# Patient Record
Sex: FEMALE | Race: WHITE | NOT HISPANIC OR LATINO | Employment: FULL TIME | URBAN - METROPOLITAN AREA
[De-identification: names, ages, dates, MRNs, and addresses within clinical notes are randomized per-mention and may not be internally consistent; named-entity substitution may affect disease eponyms.]

---

## 2023-08-12 LAB
CHLAMYDIA TRACH., AMPLIFIED: NEGATIVE
N. GONORRHEA, AMPLIFIED: NEGATIVE

## 2023-08-13 LAB — URINE CULTURE: NORMAL

## 2023-08-14 LAB
ERYTHROCYTE DISTRIBUTION WIDTH (RATIO) BY AUTOMATED COUNT: 13.3 % (ref 11.5–14.5)
ERYTHROCYTE MEAN CORPUSCULAR HEMOGLOBIN CONCENTRATION (G/DL) BY AUTOMATED: 32.7 G/DL (ref 32–36)
ERYTHROCYTE MEAN CORPUSCULAR VOLUME (FL) BY AUTOMATED COUNT: 90 FL (ref 80–100)
ERYTHROCYTES (10*6/UL) IN BLOOD BY AUTOMATED COUNT: 4.38 X10E12/L (ref 4–5.2)
HEMATOCRIT (%) IN BLOOD BY AUTOMATED COUNT: 39.4 % (ref 36–46)
HEMOGLOBIN (G/DL) IN BLOOD: 12.9 G/DL (ref 12–16)
HEPATITIS B VIRUS SURFACE AG PRESENCE IN SERUM: NONREACTIVE
HEPATITIS C VIRUS AB PRESENCE IN SERUM: NONREACTIVE
HIV 1/ 2 AG/AB SCREEN: NONREACTIVE
LEUKOCYTES (10*3/UL) IN BLOOD BY AUTOMATED COUNT: 6 X10E9/L (ref 4.4–11.3)
PLATELETS (10*3/UL) IN BLOOD AUTOMATED COUNT: 221 X10E9/L (ref 150–450)
REFLEX ADDED, ANEMIA PANEL: NORMAL
SYPHILIS TOTAL AB: NONREACTIVE

## 2023-08-15 LAB
ABO GROUP (TYPE) IN BLOOD: NORMAL
ANTIBODY SCREEN: NORMAL
ESTIMATED AVERAGE GLUCOSE FOR HBA1C: 103 MG/DL
HEMOGLOBIN A1C/HEMOGLOBIN TOTAL IN BLOOD: 5.2 %
RH FACTOR: NORMAL
RUBELLA VIRUS IGG AB: POSITIVE

## 2023-09-15 LAB
ALANINE AMINOTRANSFERASE (SGPT) (U/L) IN SER/PLAS: 14 U/L (ref 7–45)
ALBUMIN (G/DL) IN SER/PLAS: 4.1 G/DL (ref 3.4–5)
ALKALINE PHOSPHATASE (U/L) IN SER/PLAS: 61 U/L (ref 33–110)
ANION GAP IN SER/PLAS: 13 MMOL/L (ref 10–20)
ASPARTATE AMINOTRANSFERASE (SGOT) (U/L) IN SER/PLAS: 12 U/L (ref 9–39)
BILIRUBIN TOTAL (MG/DL) IN SER/PLAS: 0.4 MG/DL (ref 0–1.2)
CALCIUM (MG/DL) IN SER/PLAS: 9.8 MG/DL (ref 8.6–10.3)
CARBON DIOXIDE, TOTAL (MMOL/L) IN SER/PLAS: 25 MMOL/L (ref 21–32)
CHLORIDE (MMOL/L) IN SER/PLAS: 104 MMOL/L (ref 98–107)
CREATININE (MG/DL) IN SER/PLAS: 0.53 MG/DL (ref 0.5–1.05)
CREATININE (MG/DL) IN URINE: 62.7 MG/DL (ref 20–320)
ERYTHROCYTE DISTRIBUTION WIDTH (RATIO) BY AUTOMATED COUNT: 14.7 % (ref 11.5–14.5)
ERYTHROCYTE MEAN CORPUSCULAR HEMOGLOBIN CONCENTRATION (G/DL) BY AUTOMATED: 31.5 G/DL (ref 32–36)
ERYTHROCYTE MEAN CORPUSCULAR VOLUME (FL) BY AUTOMATED COUNT: 94 FL (ref 80–100)
ERYTHROCYTES (10*6/UL) IN BLOOD BY AUTOMATED COUNT: 4.03 X10E12/L (ref 4–5.2)
GFR FEMALE: >90 ML/MIN/1.73M2
GLUCOSE (MG/DL) IN SER/PLAS: 87 MG/DL (ref 74–99)
HEMATOCRIT (%) IN BLOOD BY AUTOMATED COUNT: 37.8 % (ref 36–46)
HEMOGLOBIN (G/DL) IN BLOOD: 11.9 G/DL (ref 12–16)
LACTATE DEHYDROGENASE (U/L) IN SER/PLAS BY LAC->PYR RXN: 121 U/L (ref 84–246)
LEUKOCYTES (10*3/UL) IN BLOOD BY AUTOMATED COUNT: 6.3 X10E9/L (ref 4.4–11.3)
PLATELETS (10*3/UL) IN BLOOD AUTOMATED COUNT: 218 X10E9/L (ref 150–450)
POTASSIUM (MMOL/L) IN SER/PLAS: 3.5 MMOL/L (ref 3.5–5.3)
PROTEIN (MG/DL) IN URINE: 6 MG/DL (ref 5–24)
PROTEIN TOTAL: 6.7 G/DL (ref 6.4–8.2)
PROTEIN/CREATININE (MG/MG) IN URINE: 0.1 MG/MG CREAT (ref 0–0.17)
SODIUM (MMOL/L) IN SER/PLAS: 138 MMOL/L (ref 136–145)
URATE (MG/DL) IN SER/PLAS: 3.8 MG/DL (ref 2.3–6.7)
UREA NITROGEN (MG/DL) IN SER/PLAS: 8 MG/DL (ref 6–23)

## 2023-10-09 ENCOUNTER — APPOINTMENT (OUTPATIENT)
Dept: RADIOLOGY | Facility: CLINIC | Age: 48
End: 2023-10-09
Payer: COMMERCIAL

## 2023-10-09 ENCOUNTER — ANCILLARY PROCEDURE (OUTPATIENT)
Dept: RADIOLOGY | Facility: CLINIC | Age: 48
End: 2023-10-09
Payer: COMMERCIAL

## 2023-10-09 DIAGNOSIS — Z36.89 SCREENING, ANTENATAL, FOR FETAL ANATOMIC SURVEY (HHS-HCC): ICD-10-CM

## 2023-10-09 PROBLEM — Z64.1 GRAND MULTIPARA: Status: ACTIVE | Noted: 2023-10-09

## 2023-10-09 PROBLEM — O10.919 CHRONIC HYPERTENSION AFFECTING PREGNANCY (HHS-HCC): Status: ACTIVE | Noted: 2023-10-09

## 2023-10-09 PROBLEM — O09.522: Status: ACTIVE | Noted: 2023-10-09

## 2023-10-09 PROBLEM — Z87.59 HISTORY OF SHOULDER DYSTOCIA IN PRIOR PREGNANCY: Status: ACTIVE | Noted: 2023-10-09

## 2023-10-09 PROCEDURE — 76811 OB US DETAILED SNGL FETUS: CPT

## 2023-10-09 PROCEDURE — 76811 OB US DETAILED SNGL FETUS: CPT | Performed by: OBSTETRICS & GYNECOLOGY

## 2023-10-10 ENCOUNTER — ROUTINE PRENATAL (OUTPATIENT)
Dept: OBSTETRICS AND GYNECOLOGY | Facility: CLINIC | Age: 48
End: 2023-10-10
Payer: COMMERCIAL

## 2023-10-10 VITALS
BODY MASS INDEX: 30.54 KG/M2 | SYSTOLIC BLOOD PRESSURE: 112 MMHG | DIASTOLIC BLOOD PRESSURE: 86 MMHG | WEIGHT: 201.5 LBS | HEIGHT: 68 IN

## 2023-10-10 DIAGNOSIS — O09.522 HIGH RISK PREGNANCY, MULTIGRAVIDA OF ADVANCED MATERNAL AGE IN SECOND TRIMESTER (HHS-HCC): Primary | ICD-10-CM

## 2023-10-10 DIAGNOSIS — O10.919 CHRONIC HYPERTENSION AFFECTING PREGNANCY (HHS-HCC): ICD-10-CM

## 2023-10-10 DIAGNOSIS — Z3A.19 19 WEEKS GESTATION OF PREGNANCY (HHS-HCC): ICD-10-CM

## 2023-10-10 PROCEDURE — 0501F PRENATAL FLOW SHEET: CPT

## 2023-10-10 RX ORDER — ASPIRIN 81 MG/1
162 TABLET ORAL DAILY
Status: ON HOLD | COMMUNITY
End: 2024-02-17 | Stop reason: WASHOUT

## 2023-10-10 NOTE — PROGRESS NOTES
Subjective     Jackelin Umana is a 48 y.o.  at 19w2d with a working estimated date of delivery of 3/3/2024, by Ultrasound who presents for a routine prenatal visit. She denies vaginal bleeding, leakage of fluid, or contractions. Patient reports feeling fetal movement and compliance with PNV and ASA    Dx of cHTN with last visit, though data is not available. Patient reports diastolic BP in the 90's. Reviewed baseline pre-e labs with patient- all wnl. Discussed  screening and recommendations for IOL at 38 weeks. Patient agreeable.       Current Outpatient Medications on File Prior to Visit   Medication Sig Dispense Refill    aspirin 81 mg EC tablet Take 2 tablets (162 mg) by mouth once daily.      prenatal no122/iron/folic acid (PRENATAL MULTI ORAL) Take by mouth once every day.       No current facility-administered medications on file prior to visit.       Her pregnancy is complicated by:  Pregnancy Problems (from 24 to present)       Problem Noted Resolved    High risk pregnancy, multigravida of advanced maternal age in second trimester 10/9/2023 by LIZZIE Marin No    Priority:  Medium      Overview Addendum 10/9/2023 10:21 PM by LIZZIE Marin     AMA >40 at [facility]  S/p rr NIPS  <>Growth US at 30 and 36 weeks  <> BPP or NST weekly  beginning at 36 weeks           Chronic hypertension affecting pregnancy 10/9/2023 by LIZZIE Marin No    Priority:  Medium      Overview Addendum 10/9/2023 10:08 PM by LIZZIE Marin     Chronic HTN not on meds  Growth US at 30 and 36 weeks  Delivery between 38.0-39.6                  Objective   Weight: 91.4 kg (201 lb 8 oz)  Expected Total Weight Gain: 5 kg (11 lb)-9 kg (19 lb)   Pregravid BMI: 30.11  BP: 112/86    Prenatal Labs  Lab Results   Component Value Date    HGB 11.9 (L) 09/15/2023    HCT 37.8 09/15/2023    HEPBSAG NONREACTIVE 2023         Assessment/Plan       Labs reviewed.  Reviewed s/sx of  PTL, warning signs, and when to call provider  Follow up in 4 weeks for a routine prenatal visit.    LIZZIE Marin

## 2023-11-08 ENCOUNTER — ROUTINE PRENATAL (OUTPATIENT)
Dept: OBSTETRICS AND GYNECOLOGY | Facility: CLINIC | Age: 48
End: 2023-11-08
Payer: COMMERCIAL

## 2023-11-08 VITALS — WEIGHT: 203.25 LBS | DIASTOLIC BLOOD PRESSURE: 86 MMHG | BODY MASS INDEX: 30.9 KG/M2 | SYSTOLIC BLOOD PRESSURE: 112 MMHG

## 2023-11-08 DIAGNOSIS — O09.522 HIGH RISK PREGNANCY, MULTIGRAVIDA OF ADVANCED MATERNAL AGE IN SECOND TRIMESTER (HHS-HCC): Primary | ICD-10-CM

## 2023-11-08 PROCEDURE — 0501F PRENATAL FLOW SHEET: CPT

## 2023-11-08 NOTE — PROGRESS NOTES
Subjective     Jackelin Umana is a 48 y.o.  at 23w3d with a working estimated date of delivery of 3/3/2024, by Ultrasound who presents for a routine prenatal visit. She denies vaginal bleeding, leakage of fluid, or contractions. Patient reports fetal movement and compliance with meds. Patient reports overall feeling well.     Patient aware of need to complete CBC and one hour prior to next visit. BLOOD TYPE O+.  Patient educated that she does not have to fast for the one hour glucose, however she should avoid a carb heavy meal prior to the test.    Patient counseled on flu vaccine and declines    Current Outpatient Medications on File Prior to Visit   Medication Sig Dispense Refill    aspirin 81 mg EC tablet Take 2 tablets (162 mg) by mouth once daily.      prenatal no122/iron/folic acid (PRENATAL MULTI ORAL) Take by mouth once every day.       No current facility-administered medications on file prior to visit.        Her pregnancy is complicated by:  Medical Problems       Problem List       High risk pregnancy, multigravida of advanced maternal age in second trimester    Overview Addendum 10/9/2023 10:21 PM by LIZZIE Marin     AMA >40 at [facility]  S/p rr NIPS  <>Growth US at 30 and 36 weeks  <> BPP or NST weekly  beginning at 36 weeks           Chronic hypertension affecting pregnancy    Overview Addendum 10/9/2023 10:08 PM by LIZZIE Marin     Chronic HTN not on meds  Growth US at 30 and 36 weeks  Delivery between 38.0-39.6         History of shoulder dystocia in prior pregnancy    Overview Addendum 10/9/2023 10:20 PM by LIZZIE Marin     Physician counseling in third trimester for hx of shoulder dystocia         Grand multipara    Overview Addendum 10/9/2023 10:20 PM by LIZZIE Marin     HX of: GDM;Pre-e, gHTN- on ASA; PPH in first pregnancy  Shoulder dystocia in last pregnancy @ 9# 0oz, proven to 9# 4 oz. Physician counseling in third trimester for hx  of shoulder dystocia  Declines flu and COVID                  Objective   Weight: 92.2 kg (203 lb 4 oz)  Expected Total Weight Gain: 5 kg (11 lb)-9 kg (19 lb)   TW.381 kg (5 lb 4 oz)  Pregravid BMI: 30.11      BP: 112/86      Assessment/Plan   One hour and CBC with next visit.   Offer tdap at next visit   Follow up in 2 weeks for a routine prenatal visit.    JOSE MIGUEL Marin-TORITO

## 2023-12-08 ENCOUNTER — ROUTINE PRENATAL (OUTPATIENT)
Dept: OBSTETRICS AND GYNECOLOGY | Facility: CLINIC | Age: 48
End: 2023-12-08
Payer: COMMERCIAL

## 2023-12-08 ENCOUNTER — HOSPITAL ENCOUNTER (OUTPATIENT)
Facility: HOSPITAL | Age: 48
Discharge: HOME | End: 2023-12-08
Attending: OBSTETRICS & GYNECOLOGY | Admitting: OBSTETRICS & GYNECOLOGY
Payer: COMMERCIAL

## 2023-12-08 VITALS
TEMPERATURE: 98.2 F | RESPIRATION RATE: 19 BRPM | DIASTOLIC BLOOD PRESSURE: 68 MMHG | SYSTOLIC BLOOD PRESSURE: 150 MMHG | HEART RATE: 76 BPM | OXYGEN SATURATION: 97 % | HEIGHT: 68 IN | BODY MASS INDEX: 30.82 KG/M2 | WEIGHT: 203.37 LBS

## 2023-12-08 VITALS — DIASTOLIC BLOOD PRESSURE: 84 MMHG | BODY MASS INDEX: 30.87 KG/M2 | WEIGHT: 203 LBS | SYSTOLIC BLOOD PRESSURE: 152 MMHG

## 2023-12-08 DIAGNOSIS — Z3A.27 27 WEEKS GESTATION OF PREGNANCY (HHS-HCC): ICD-10-CM

## 2023-12-08 DIAGNOSIS — Z13.1 ENCOUNTER FOR SCREENING FOR DIABETES MELLITUS: ICD-10-CM

## 2023-12-08 LAB
ALBUMIN SERPL BCP-MCNC: 3.4 G/DL (ref 3.4–5)
ALP SERPL-CCNC: 71 U/L (ref 33–110)
ALT SERPL W P-5'-P-CCNC: 9 U/L (ref 7–45)
ANION GAP SERPL CALC-SCNC: 9 MMOL/L (ref 10–20)
AST SERPL W P-5'-P-CCNC: 12 U/L (ref 9–39)
BILIRUB SERPL-MCNC: 0.4 MG/DL (ref 0–1.2)
BUN SERPL-MCNC: 5 MG/DL (ref 6–23)
CALCIUM SERPL-MCNC: 8.8 MG/DL (ref 8.6–10.3)
CHLORIDE SERPL-SCNC: 105 MMOL/L (ref 98–107)
CO2 SERPL-SCNC: 27 MMOL/L (ref 21–32)
CREAT SERPL-MCNC: 0.43 MG/DL (ref 0.5–1.05)
CREAT UR-MCNC: 4.6 MG/DL (ref 20–320)
ERYTHROCYTE [DISTWIDTH] IN BLOOD BY AUTOMATED COUNT: 15.7 % (ref 11.5–14.5)
ERYTHROCYTE [DISTWIDTH] IN BLOOD BY AUTOMATED COUNT: 15.9 % (ref 11.5–14.5)
GFR SERPL CREATININE-BSD FRML MDRD: >90 ML/MIN/1.73M*2
GLUCOSE 1H P 50 G GLC PO SERPL-MCNC: 150 MG/DL
GLUCOSE SERPL-MCNC: 62 MG/DL (ref 74–99)
HCT VFR BLD AUTO: 29 % (ref 36–46)
HCT VFR BLD AUTO: 30.1 % (ref 36–46)
HGB BLD-MCNC: 9.4 G/DL (ref 12–16)
HGB BLD-MCNC: 9.5 G/DL (ref 12–16)
LDH SERPL L TO P-CCNC: 130 U/L (ref 84–246)
MCH RBC QN AUTO: 29 PG (ref 26–34)
MCH RBC QN AUTO: 29.8 PG (ref 26–34)
MCHC RBC AUTO-ENTMCNC: 31.6 G/DL (ref 32–36)
MCHC RBC AUTO-ENTMCNC: 32.4 G/DL (ref 32–36)
MCV RBC AUTO: 90 FL (ref 80–100)
MCV RBC AUTO: 94 FL (ref 80–100)
NRBC BLD-RTO: 0 /100 WBCS (ref 0–0)
NRBC BLD-RTO: 0 /100 WBCS (ref 0–0)
PLATELET # BLD AUTO: 162 X10*3/UL (ref 150–450)
PLATELET # BLD AUTO: 176 X10*3/UL (ref 150–450)
POTASSIUM SERPL-SCNC: 3.3 MMOL/L (ref 3.5–5.3)
PROT SERPL-MCNC: 6.2 G/DL (ref 6.4–8.2)
PROT UR-ACNC: <4 MG/DL (ref 5–24)
PROT/CREAT UR: ABNORMAL MG/G{CREAT}
RBC # BLD AUTO: 3.19 X10*6/UL (ref 4–5.2)
RBC # BLD AUTO: 3.24 X10*6/UL (ref 4–5.2)
SODIUM SERPL-SCNC: 138 MMOL/L (ref 136–145)
WBC # BLD AUTO: 5.6 X10*3/UL (ref 4.4–11.3)
WBC # BLD AUTO: 6 X10*3/UL (ref 4.4–11.3)

## 2023-12-08 PROCEDURE — 82728 ASSAY OF FERRITIN: CPT

## 2023-12-08 PROCEDURE — 99214 OFFICE O/P EST MOD 30 MIN: CPT | Performed by: OBSTETRICS & GYNECOLOGY

## 2023-12-08 PROCEDURE — 83550 IRON BINDING TEST: CPT

## 2023-12-08 PROCEDURE — 85027 COMPLETE CBC AUTOMATED: CPT | Performed by: OBSTETRICS & GYNECOLOGY

## 2023-12-08 PROCEDURE — 82947 ASSAY GLUCOSE BLOOD QUANT: CPT

## 2023-12-08 PROCEDURE — 82607 VITAMIN B-12: CPT

## 2023-12-08 PROCEDURE — 80053 COMPREHEN METABOLIC PANEL: CPT | Performed by: OBSTETRICS & GYNECOLOGY

## 2023-12-08 PROCEDURE — 82746 ASSAY OF FOLIC ACID SERUM: CPT

## 2023-12-08 PROCEDURE — 85027 COMPLETE CBC AUTOMATED: CPT

## 2023-12-08 PROCEDURE — 82570 ASSAY OF URINE CREATININE: CPT | Performed by: OBSTETRICS & GYNECOLOGY

## 2023-12-08 PROCEDURE — 36415 COLL VENOUS BLD VENIPUNCTURE: CPT | Performed by: OBSTETRICS & GYNECOLOGY

## 2023-12-08 PROCEDURE — 83615 LACTATE (LD) (LDH) ENZYME: CPT | Performed by: OBSTETRICS & GYNECOLOGY

## 2023-12-08 PROCEDURE — 0501F PRENATAL FLOW SHEET: CPT | Performed by: ADVANCED PRACTICE MIDWIFE

## 2023-12-08 PROCEDURE — 99213 OFFICE O/P EST LOW 20 MIN: CPT | Mod: 25

## 2023-12-08 PROCEDURE — 36415 COLL VENOUS BLD VENIPUNCTURE: CPT

## 2023-12-08 RX ORDER — LABETALOL HYDROCHLORIDE 5 MG/ML
20 INJECTION, SOLUTION INTRAVENOUS ONCE AS NEEDED
Status: DISCONTINUED | OUTPATIENT
Start: 2023-12-08 | End: 2023-12-08 | Stop reason: HOSPADM

## 2023-12-08 RX ORDER — ONDANSETRON 4 MG/1
4 TABLET, FILM COATED ORAL EVERY 6 HOURS PRN
Status: DISCONTINUED | OUTPATIENT
Start: 2023-12-08 | End: 2023-12-08 | Stop reason: HOSPADM

## 2023-12-08 RX ORDER — NIFEDIPINE 10 MG/1
10 CAPSULE ORAL ONCE AS NEEDED
Status: DISCONTINUED | OUTPATIENT
Start: 2023-12-08 | End: 2023-12-08 | Stop reason: HOSPADM

## 2023-12-08 RX ORDER — LIDOCAINE HYDROCHLORIDE 10 MG/ML
0.5 INJECTION, SOLUTION EPIDURAL; INFILTRATION; INTRACAUDAL; PERINEURAL ONCE AS NEEDED
Status: DISCONTINUED | OUTPATIENT
Start: 2023-12-08 | End: 2023-12-08 | Stop reason: HOSPADM

## 2023-12-08 RX ORDER — FERROUS SULFATE 325(65) MG
65 TABLET ORAL
Qty: 30 TABLET | Refills: 3 | Status: SHIPPED | OUTPATIENT
Start: 2023-12-08 | End: 2024-01-07

## 2023-12-08 RX ORDER — ONDANSETRON HYDROCHLORIDE 2 MG/ML
4 INJECTION, SOLUTION INTRAVENOUS EVERY 6 HOURS PRN
Status: DISCONTINUED | OUTPATIENT
Start: 2023-12-08 | End: 2023-12-08 | Stop reason: HOSPADM

## 2023-12-08 RX ORDER — HYDRALAZINE HYDROCHLORIDE 20 MG/ML
5 INJECTION INTRAMUSCULAR; INTRAVENOUS ONCE AS NEEDED
Status: DISCONTINUED | OUTPATIENT
Start: 2023-12-08 | End: 2023-12-08 | Stop reason: HOSPADM

## 2023-12-08 SDOH — SOCIAL STABILITY: SOCIAL INSECURITY: HAVE YOU HAD THOUGHTS OF HARMING ANYONE ELSE?: NO

## 2023-12-08 SDOH — HEALTH STABILITY: MENTAL HEALTH: HAVE YOU USED ANY PRESCRIPTION DRUGS OTHER THAN PRESCRIBED IN THE PAST 12 MONTHS?: NO

## 2023-12-08 SDOH — SOCIAL STABILITY: SOCIAL INSECURITY: DO YOU FEEL ANYONE HAS EXPLOITED OR TAKEN ADVANTAGE OF YOU FINANCIALLY OR OF YOUR PERSONAL PROPERTY?: NO

## 2023-12-08 SDOH — HEALTH STABILITY: MENTAL HEALTH: HAVE YOU USED ANY SUBSTANCES (CANABIS, COCAINE, HEROIN, HALLUCINOGENS, INHALANTS, ETC.) IN THE PAST 12 MONTHS?: NO

## 2023-12-08 SDOH — HEALTH STABILITY: MENTAL HEALTH: NON-SPECIFIC ACTIVE SUICIDAL THOUGHTS (PAST 1 MONTH): NO

## 2023-12-08 SDOH — SOCIAL STABILITY: SOCIAL INSECURITY: DOES ANYONE TRY TO KEEP YOU FROM HAVING/CONTACTING OTHER FRIENDS OR DOING THINGS OUTSIDE YOUR HOME?: NO

## 2023-12-08 SDOH — SOCIAL STABILITY: SOCIAL INSECURITY: PHYSICAL ABUSE: DENIES

## 2023-12-08 SDOH — SOCIAL STABILITY: SOCIAL INSECURITY: VERBAL ABUSE: DENIES

## 2023-12-08 SDOH — SOCIAL STABILITY: SOCIAL INSECURITY: ARE YOU OR HAVE YOU BEEN THREATENED OR ABUSED PHYSICALLY, EMOTIONALLY, OR SEXUALLY BY ANYONE?: NO

## 2023-12-08 SDOH — SOCIAL STABILITY: SOCIAL INSECURITY: ARE THERE ANY APPARENT SIGNS OF INJURIES/BEHAVIORS THAT COULD BE RELATED TO ABUSE/NEGLECT?: NO

## 2023-12-08 SDOH — SOCIAL STABILITY: SOCIAL INSECURITY: ABUSE SCREEN: ADULT

## 2023-12-08 SDOH — HEALTH STABILITY: MENTAL HEALTH: WERE YOU ABLE TO COMPLETE ALL THE BEHAVIORAL HEALTH SCREENINGS?: YES

## 2023-12-08 SDOH — SOCIAL STABILITY: SOCIAL INSECURITY: HAS ANYONE EVER THREATENED TO HURT YOUR FAMILY OR YOUR PETS?: NO

## 2023-12-08 SDOH — HEALTH STABILITY: MENTAL HEALTH: WISH TO BE DEAD (PAST 1 MONTH): NO

## 2023-12-08 SDOH — HEALTH STABILITY: MENTAL HEALTH: SUICIDAL BEHAVIOR (LIFETIME): NO

## 2023-12-08 SDOH — ECONOMIC STABILITY: HOUSING INSECURITY: DO YOU FEEL UNSAFE GOING BACK TO THE PLACE WHERE YOU ARE LIVING?: NO

## 2023-12-08 ASSESSMENT — SOCIAL DETERMINANTS OF HEALTH (SDOH)
WITHIN THE LAST YEAR, HAVE YOU BEEN HUMILIATED OR EMOTIONALLY ABUSED IN OTHER WAYS BY YOUR PARTNER OR EX-PARTNER?: NO
HOW OFTEN DO YOU GET TOGETHER WITH FRIENDS OR RELATIVES?: MORE THAN THREE TIMES A WEEK
HOW OFTEN DO YOU ATTEND CHURCH OR RELIGIOUS SERVICES?: MORE THAN 4 TIMES PER YEAR
DO YOU BELONG TO ANY CLUBS OR ORGANIZATIONS SUCH AS CHURCH GROUPS UNIONS, FRATERNAL OR ATHLETIC GROUPS, OR SCHOOL GROUPS?: YES
WITHIN THE LAST YEAR, HAVE TO BEEN RAPED OR FORCED TO HAVE ANY KIND OF SEXUAL ACTIVITY BY YOUR PARTNER OR EX-PARTNER?: NO
IN A TYPICAL WEEK, HOW MANY TIMES DO YOU TALK ON THE PHONE WITH FAMILY, FRIENDS, OR NEIGHBORS?: MORE THAN THREE TIMES A WEEK
WITHIN THE LAST YEAR, HAVE YOU BEEN AFRAID OF YOUR PARTNER OR EX-PARTNER?: NO
WITHIN THE LAST YEAR, HAVE YOU BEEN KICKED, HIT, SLAPPED, OR OTHERWISE PHYSICALLY HURT BY YOUR PARTNER OR EX-PARTNER?: NO
HOW OFTEN DO YOU ATTENT MEETINGS OF THE CLUB OR ORGANIZATION YOU BELONG TO?: MORE THAN 4 TIMES PER YEAR

## 2023-12-08 ASSESSMENT — LIFESTYLE VARIABLES
AUDIT-C TOTAL SCORE: 0
AUDIT-C TOTAL SCORE: 0
HOW OFTEN DO YOU HAVE A DRINK CONTAINING ALCOHOL: NEVER
SKIP TO QUESTIONS 9-10: 1
HOW OFTEN DO YOU HAVE 6 OR MORE DRINKS ON ONE OCCASION: NEVER
HOW MANY STANDARD DRINKS CONTAINING ALCOHOL DO YOU HAVE ON A TYPICAL DAY: PATIENT DOES NOT DRINK

## 2023-12-08 ASSESSMENT — PATIENT HEALTH QUESTIONNAIRE - PHQ9
2. FEELING DOWN, DEPRESSED OR HOPELESS: NOT AT ALL
1. LITTLE INTEREST OR PLEASURE IN DOING THINGS: NOT AT ALL
SUM OF ALL RESPONSES TO PHQ9 QUESTIONS 1 & 2: 0

## 2023-12-08 ASSESSMENT — PAIN SCALES - GENERAL: PAINLEVEL_OUTOF10: 0 - NO PAIN

## 2023-12-08 NOTE — H&P
Obstetrical TRIAGE History and Physical    Assessment/Plan    Jackelin Umana is a 48 y.o.  at 27w5d admitted for mild range BP in office likely cHTN.    #cHTN  HELLP labs nl  No sx's superimposed preeclampsia with severe features  Plan MFM follow up next week and ultrasound  Will start home BP monitoring, warning signs given    #anemia  Reflex panel pending  Will start oral iron    Medical Problems       Problem List       High risk pregnancy, multigravida of advanced maternal age in second trimester    Overview Addendum 10/9/2023 10:21 PM by LIZZIE Marin     AMA >40 at [facility]  S/p rr NIPS  <>Growth US at 30 and 36 weeks  <> BPP or NST weekly  beginning at 36 weeks           Chronic hypertension affecting pregnancy    Overview Addendum 10/9/2023 10:08 PM by LIZZIE Marin     Chronic HTN not on meds  Growth US at 30 and 36 weeks  Delivery between 38.0-39.6         History of shoulder dystocia in prior pregnancy    Overview Addendum 10/9/2023 10:20 PM by LIZZIE Marin     Physician counseling in third trimester for hx of shoulder dystocia         Grand multipara    Overview Addendum 10/9/2023 10:20 PM by LIZZIE Marin     HX of: GDM;Pre-e, gHTN- on ASA; PPH in first pregnancy  Shoulder dystocia in last pregnancy @ 9# 0oz, proven to 9# 4 oz. Physician counseling in third trimester for hx of shoulder dystocia  Declines flu and COVID                 Subjective   48 yo  sent from office for mild range BP in office.  H/o PEC with prior pregnancies requiring Mag.  Has had recorded BP elevations outside of pregnancy and a borderline BP in early pregnancy.  Does not endorse headache, vision change, RUQ pain, dyspnea, or chest pain.      Obstetrical History   OB History    Para Term  AB Living   11 7 7   3 7   SAB IAB Ectopic Multiple Live Births   3       7      # Outcome Date GA Lbr Alexandro/2nd Weight Sex Delivery Anes PTL Lv   11 Current             10 SAB 2022 7w0d          9 Term 12/31/18 40w0d  4082 g F Vag-Spont None  DAJA      Complications: Shoulder Dystocia   8 Term 09/08/16 39w0d  4196 g F Vag-Spont None  DAJA   7 Term 03/05/14 38w5d  4139 g F Vag-Spont EPI  DAJA   6 Term 02/10/12 39w0d  4082 g M Vag-Spont EPI  DAJA   5 Term 11/03/08 39w0d  4167 g F Vag-Spont None  DAJA   4 Term 02/08/07 39w0d  4082 g M Vag-Spont EPI  DAJA   3 Term 01/18/05 38w0d  4111 g M Vag-Spont EPI  DAJA   2 SAB  6w0d          1 SAB  6w0d              Past Medical History  Past Medical History:   Diagnosis Date    Gestational diabetes     Postpartum hypertension         Past Surgical History   History reviewed. No pertinent surgical history.    Social History  Social History     Tobacco Use    Smoking status: Former     Types: Cigarettes    Smokeless tobacco: Not on file   Substance Use Topics    Alcohol use: Not Currently     Substance and Sexual Activity   Drug Use Never       Allergies  Patient has no known allergies.     Medications  Medications Prior to Admission   Medication Sig Dispense Refill Last Dose    aspirin 81 mg EC tablet Take 2 tablets (162 mg) by mouth once daily.   12/8/2023    prenatal no122/iron/folic acid (PRENATAL MULTI ORAL) Take by mouth once every day.   12/8/2023       Objective    Last Vitals  Temp Pulse Resp BP MAP O2 Sat   36.9 °C (98.4 °F) 87 20 (!) 140/70   97 %     Physical Examination  General: no acute distress  HEENT: normocephalic, atraumatic  Heart: RRR no murmur  Lungs: clear b/l  Abdomen: gravid  Extremities: moving all extremities, DTR +2  Neuro: awake and conversant  Psych: appropriate mood and affect    NST  Baseline FHR:140  Accelerations: +  Decelerations: -  AGA      Lab Review  Lab Results   Component Value Date    WBC 6.0 12/08/2023    HGB 9.4 (L) 12/08/2023    HCT 29.0 (L) 12/08/2023     12/08/2023     Lab Results   Component Value Date    GLUCOSE 62 (L) 12/08/2023     12/08/2023    K 3.3 (L) 12/08/2023     12/08/2023     CO2 27 12/08/2023    ANIONGAP 9 (L) 12/08/2023    BUN 5 (L) 12/08/2023    CREATININE 0.43 (L) 12/08/2023    EGFR >90 12/08/2023    CALCIUM 8.8 12/08/2023    ALBUMIN 3.4 12/08/2023    PROT 6.2 (L) 12/08/2023    ALKPHOS 71 12/08/2023    ALT 9 12/08/2023    AST 12 12/08/2023    BILITOT 0.4 12/08/2023     Lab Results   Component Value Date    [facility]  12/08/2023      Comment:      One or more analytes used in this calculation is outside of the analytical measurement range. Calculation cannot be performed.

## 2023-12-08 NOTE — PROGRESS NOTES
Subjective   Patient ID [id number]   Jackelin Umana is a 48 y.o.  at 27w5d with a working estimated date of delivery of 3/3/2024, by Ultrasound who presents for a routine prenatal visit. She denies vaginal bleeding, contractions or leaking of fluid.    No complaints today  Denies h/a visual changes or ruq pain  H/o preeclampsia necessitating magnesium sulfate  Currently on ASA    Objective   Physical Exam:     Constitutional: Alert and oriented, cooperative, no acute distress, well nourished, well hydrated     HEENT: normal     OB:  fundus at 29 cm    Neuropsych: normal affect, well groomed, good eye contact           , Pregravid BMI: 30.11  Expected Total Weight Gain: 5 kg (11 lb)-9 kg (19 lb)             Lab Results   Component Value Date    HGB 11.9 (L) 09/15/2023    ABO O 2023    LABRH POS 2023       Assessment/Plan   Continue prenatal vitamin.  CBC, GTT today  Sent to hospital for serial bp and labs explained to pt need to monitor  Discussed with Dr. Watters Pt to transfer to physician service, consult with MFM, ultrasounds scheduled

## 2023-12-08 NOTE — NURSING NOTE
Dr. Watters at bedside. Assessment and H&P performed. New orders received to draw Pre-E labs and obtain a 20 minute NST

## 2023-12-08 NOTE — NURSING NOTE
Dr. Watters at patient bedside. H&P performed with assessment. New orders received to obtain a 20 min NST and pre e labs

## 2023-12-08 NOTE — NURSING NOTE
Discuessed with patient BP monitoring at home. Patient assessed and meets criteria with diagnosis of Preeclampsia/maternal hypertension or any previous hypertension issues. Pt agreed to ordering home BP monitoring equipment. Demo sheet and order completed. Home monitoring log reviewed with patient prior to discharge. Pt verbalizes understanding of importance in home BP monitoring.

## 2023-12-09 LAB
FERRITIN SERPL-MCNC: 20 NG/ML
FOLATE SERPL-MCNC: >24 NG/ML
IRON SATN MFR SERPL: 17 %
IRON SERPL-MCNC: 83 UG/DL
REFLEX ADDED, ANEMIA PANEL: NORMAL
TIBC SERPL-MCNC: 492 UG/DL
UIBC SERPL-MCNC: 409 UG/DL
VIT B12 SERPL-MCNC: 280 PG/ML

## 2023-12-10 PROBLEM — O99.013 ANEMIA AFFECTING PREGNANCY IN THIRD TRIMESTER (HHS-HCC): Status: ACTIVE | Noted: 2023-12-10

## 2023-12-10 PROBLEM — R73.09 ELEVATED GLUCOSE TOLERANCE TEST: Status: ACTIVE | Noted: 2023-12-10

## 2023-12-11 DIAGNOSIS — O10.919 CHRONIC HYPERTENSION AFFECTING PREGNANCY (HHS-HCC): ICD-10-CM

## 2023-12-11 DIAGNOSIS — Z13.1 SCREENING FOR DIABETES MELLITUS: ICD-10-CM

## 2023-12-13 LAB
HOLD SPECIMEN: NORMAL

## 2023-12-18 ENCOUNTER — LAB (OUTPATIENT)
Dept: LAB | Facility: LAB | Age: 48
End: 2023-12-18
Payer: COMMERCIAL

## 2023-12-18 ENCOUNTER — DOCUMENTATION (OUTPATIENT)
Dept: OBSTETRICS AND GYNECOLOGY | Facility: HOSPITAL | Age: 48
End: 2023-12-18

## 2023-12-18 DIAGNOSIS — Z13.1 SCREENING FOR DIABETES MELLITUS: ICD-10-CM

## 2023-12-18 LAB
GLUCOSE 1H P 100 G GLC PO SERPL-MCNC: 200 MG/DL
GLUCOSE 2H P 100 G GLC PO SERPL-MCNC: 134 MG/DL
GLUCOSE 3H P 100 G GLC PO SERPL-MCNC: 38 MG/DL
GLUCOSE P FAST SERPL-MCNC: 109 MG/DL

## 2023-12-18 PROCEDURE — 36415 COLL VENOUS BLD VENIPUNCTURE: CPT

## 2023-12-18 PROCEDURE — 82951 GLUCOSE TOLERANCE TEST (GTT): CPT

## 2023-12-18 PROCEDURE — 82950 GLUCOSE TEST: CPT

## 2023-12-18 PROCEDURE — 82947 ASSAY GLUCOSE BLOOD QUANT: CPT

## 2023-12-18 PROCEDURE — 82952 GTT-ADDED SAMPLES: CPT

## 2023-12-18 NOTE — PROGRESS NOTES
Received critical lab value call from the lab.  Patient's 3 hour glucose was 38.  Called to see how patient was feeling x 2.  No answer received.  Left message to call back.

## 2023-12-20 ENCOUNTER — ROUTINE PRENATAL (OUTPATIENT)
Dept: OBSTETRICS AND GYNECOLOGY | Facility: CLINIC | Age: 48
End: 2023-12-20
Payer: COMMERCIAL

## 2023-12-20 VITALS
WEIGHT: 204.25 LBS | BODY MASS INDEX: 31.06 KG/M2 | DIASTOLIC BLOOD PRESSURE: 80 MMHG | SYSTOLIC BLOOD PRESSURE: 148 MMHG

## 2023-12-20 DIAGNOSIS — Z64.1 GRAND MULTIPARA: ICD-10-CM

## 2023-12-20 DIAGNOSIS — O99.013 ANEMIA AFFECTING PREGNANCY IN THIRD TRIMESTER (HHS-HCC): ICD-10-CM

## 2023-12-20 DIAGNOSIS — O24.410 DIET CONTROLLED GESTATIONAL DIABETES MELLITUS (GDM) IN SECOND TRIMESTER (HHS-HCC): ICD-10-CM

## 2023-12-20 DIAGNOSIS — Z87.59 HISTORY OF SHOULDER DYSTOCIA IN PRIOR PREGNANCY: Primary | ICD-10-CM

## 2023-12-20 DIAGNOSIS — O10.919 CHRONIC HYPERTENSION AFFECTING PREGNANCY (HHS-HCC): ICD-10-CM

## 2023-12-20 DIAGNOSIS — O24.419 GESTATIONAL DIABETES MELLITUS (GDM) IN THIRD TRIMESTER, GESTATIONAL DIABETES METHOD OF CONTROL UNSPECIFIED (HHS-HCC): ICD-10-CM

## 2023-12-20 PROBLEM — R73.09 ELEVATED GLUCOSE TOLERANCE TEST: Status: RESOLVED | Noted: 2023-12-10 | Resolved: 2023-12-20

## 2023-12-20 PROCEDURE — 0501F PRENATAL FLOW SHEET: CPT | Performed by: OBSTETRICS & GYNECOLOGY

## 2023-12-20 RX ORDER — ISOPROPYL ALCOHOL 70 ML/100ML
SWAB TOPICAL
Qty: 100 EACH | Refills: 2 | Status: ON HOLD | OUTPATIENT
Start: 2023-12-20 | End: 2024-02-17 | Stop reason: WASHOUT

## 2023-12-20 RX ORDER — LANCETS
EACH MISCELLANEOUS
Qty: 100 EACH | Refills: 2 | Status: ON HOLD | OUTPATIENT
Start: 2023-12-20 | End: 2024-02-17 | Stop reason: WASHOUT

## 2023-12-20 RX ORDER — INSULIN PUMP SYRINGE, 3 ML
1 EACH MISCELLANEOUS 4 TIMES DAILY
Qty: 1 EACH | Refills: 0 | Status: ON HOLD | OUTPATIENT
Start: 2023-12-20 | End: 2024-02-17 | Stop reason: WASHOUT

## 2023-12-20 NOTE — PROGRESS NOTES
Medical Problems       Problem List    History of Postpartum hemorrhage    High risk pregnancy, multigravida of advanced maternal age in second trimester    Overview Addendum 10/9/2023 10:21 PM by LIZZIE Marin     AMA >40 at [facility]  S/p rr NIPS  <>Growth US at 30 and 36 weeks  <> BPP or NST weekly  beginning at 36 weeks           Chronic hypertension affecting pregnancy    Overview Addendum 10/9/2023 10:08 PM by LIZZIE Marin     Chronic HTN not on meds Home Bps wnl.   HELLP labs wnl last week   Cont monitoring, severe sx discussed.   Growth US at 30 and 36 weeks  Delivery between 38.0-39.6          History of shoulder dystocia in prior pregnancy    Overview Addendum 10/9/2023 10:20 PM by LIZZIE Marin     Physician counseling in third trimester for hx of shoulder dystocia  Option of csection discussed briefly today/ mb.         Grand multipara    Overview Addendum 10/9/2023 10:20 PM by LIZZIE Marin     HX of: GDM;Pre-e, gHTN- on ASA; PPH in first pregnancy  Shoulder dystocia in last pregnancy @ 9# 0oz, proven to 9# 4 oz. Physician counseling in third trimester for hx of shoulder dystocia  Declines flu and COVID           Anemia affecting pregnancy in third trimester    Overview Signed 12/10/2023 11:27 AM by LIZZIE Mitchell     Iron bid  Recheck labs 2 weeks         Gestational diabetes mellitus (GDM) in second trimester       To start Glucometer, MFM consult /US     Discussed switching to physician care     Eri Lewis MD

## 2023-12-26 ENCOUNTER — ANCILLARY PROCEDURE (OUTPATIENT)
Dept: RADIOLOGY | Facility: CLINIC | Age: 48
End: 2023-12-26
Payer: COMMERCIAL

## 2023-12-26 DIAGNOSIS — Z34.90 ENCOUNTER FOR SUPERVISION OF NORMAL PREGNANCY, UNSPECIFIED, UNSPECIFIED TRIMESTER (HHS-HCC): ICD-10-CM

## 2023-12-26 DIAGNOSIS — O09.529 AMA (ADVANCED MATERNAL AGE) MULTIGRAVIDA 35+ (HHS-HCC): ICD-10-CM

## 2023-12-26 PROCEDURE — 76819 FETAL BIOPHYS PROFIL W/O NST: CPT

## 2023-12-26 PROCEDURE — 76816 OB US FOLLOW-UP PER FETUS: CPT

## 2023-12-26 PROCEDURE — 76819 FETAL BIOPHYS PROFIL W/O NST: CPT | Performed by: OBSTETRICS & GYNECOLOGY

## 2023-12-26 PROCEDURE — 76816 OB US FOLLOW-UP PER FETUS: CPT | Performed by: OBSTETRICS & GYNECOLOGY

## 2023-12-27 ENCOUNTER — HOSPITAL ENCOUNTER (OUTPATIENT)
Facility: HOSPITAL | Age: 48
Discharge: HOME | End: 2023-12-27
Attending: OBSTETRICS & GYNECOLOGY | Admitting: OBSTETRICS & GYNECOLOGY
Payer: COMMERCIAL

## 2023-12-27 ENCOUNTER — HOSPITAL ENCOUNTER (OUTPATIENT)
Facility: HOSPITAL | Age: 48
End: 2023-12-27
Attending: OBSTETRICS & GYNECOLOGY | Admitting: OBSTETRICS & GYNECOLOGY
Payer: COMMERCIAL

## 2023-12-27 ENCOUNTER — INITIAL PRENATAL (OUTPATIENT)
Dept: MATERNAL FETAL MEDICINE | Facility: CLINIC | Age: 48
End: 2023-12-27
Payer: COMMERCIAL

## 2023-12-27 ENCOUNTER — EDUCATION (OUTPATIENT)
Dept: MATERNAL FETAL MEDICINE | Facility: HOSPITAL | Age: 48
End: 2023-12-27
Payer: COMMERCIAL

## 2023-12-27 VITALS
BODY MASS INDEX: 31.07 KG/M2 | OXYGEN SATURATION: 94 % | SYSTOLIC BLOOD PRESSURE: 129 MMHG | TEMPERATURE: 97.9 F | RESPIRATION RATE: 18 BRPM | WEIGHT: 205 LBS | DIASTOLIC BLOOD PRESSURE: 59 MMHG | HEART RATE: 90 BPM | HEIGHT: 68 IN

## 2023-12-27 VITALS — WEIGHT: 205 LBS | SYSTOLIC BLOOD PRESSURE: 162 MMHG | BODY MASS INDEX: 31.17 KG/M2 | DIASTOLIC BLOOD PRESSURE: 82 MMHG

## 2023-12-27 DIAGNOSIS — O99.013 ANEMIA AFFECTING PREGNANCY IN THIRD TRIMESTER (HHS-HCC): ICD-10-CM

## 2023-12-27 DIAGNOSIS — O09.522 HIGH RISK PREGNANCY, MULTIGRAVIDA OF ADVANCED MATERNAL AGE IN SECOND TRIMESTER (HHS-HCC): ICD-10-CM

## 2023-12-27 DIAGNOSIS — Z87.59 HISTORY OF POSTPARTUM HEMORRHAGE: ICD-10-CM

## 2023-12-27 DIAGNOSIS — O10.919 CHRONIC HYPERTENSION AFFECTING PREGNANCY (HHS-HCC): ICD-10-CM

## 2023-12-27 DIAGNOSIS — O24.414 INSULIN CONTROLLED GESTATIONAL DIABETES MELLITUS (GDM) IN SECOND TRIMESTER (HHS-HCC): ICD-10-CM

## 2023-12-27 LAB
ABO GROUP (TYPE) IN BLOOD: NORMAL
ALBUMIN SERPL BCP-MCNC: 3.4 G/DL (ref 3.4–5)
ALP SERPL-CCNC: 88 U/L (ref 33–110)
ALT SERPL W P-5'-P-CCNC: 12 U/L (ref 7–45)
ANION GAP SERPL CALC-SCNC: 12 MMOL/L (ref 10–20)
ANTIBODY SCREEN: NORMAL
AST SERPL W P-5'-P-CCNC: 14 U/L (ref 9–39)
BASOPHILS # BLD AUTO: 0.01 X10*3/UL (ref 0–0.1)
BASOPHILS NFR BLD AUTO: 0.2 %
BILIRUB SERPL-MCNC: 0.5 MG/DL (ref 0–1.2)
BUN SERPL-MCNC: 8 MG/DL (ref 6–23)
CALCIUM SERPL-MCNC: 8.6 MG/DL (ref 8.6–10.3)
CHLORIDE SERPL-SCNC: 106 MMOL/L (ref 98–107)
CO2 SERPL-SCNC: 24 MMOL/L (ref 21–32)
CREAT SERPL-MCNC: 0.42 MG/DL (ref 0.5–1.05)
CREAT UR-MCNC: 46.6 MG/DL (ref 20–320)
EOSINOPHIL # BLD AUTO: 0.05 X10*3/UL (ref 0–0.7)
EOSINOPHIL NFR BLD AUTO: 0.9 %
ERYTHROCYTE [DISTWIDTH] IN BLOOD BY AUTOMATED COUNT: 16.4 % (ref 11.5–14.5)
GFR SERPL CREATININE-BSD FRML MDRD: >90 ML/MIN/1.73M*2
GLUCOSE SERPL-MCNC: 139 MG/DL (ref 74–99)
HCT VFR BLD AUTO: 28.6 % (ref 36–46)
HGB BLD-MCNC: 9.5 G/DL (ref 12–16)
IMM GRANULOCYTES # BLD AUTO: 0.05 X10*3/UL (ref 0–0.7)
IMM GRANULOCYTES NFR BLD AUTO: 0.9 % (ref 0–0.9)
LYMPHOCYTES # BLD AUTO: 0.93 X10*3/UL (ref 1.2–4.8)
LYMPHOCYTES NFR BLD AUTO: 16.6 %
MCH RBC QN AUTO: 29.6 PG (ref 26–34)
MCHC RBC AUTO-ENTMCNC: 33.2 G/DL (ref 32–36)
MCV RBC AUTO: 89 FL (ref 80–100)
MONOCYTES # BLD AUTO: 0.32 X10*3/UL (ref 0.1–1)
MONOCYTES NFR BLD AUTO: 5.7 %
NEUTROPHILS # BLD AUTO: 4.24 X10*3/UL (ref 1.2–7.7)
NEUTROPHILS NFR BLD AUTO: 75.7 %
NRBC BLD-RTO: 0 /100 WBCS (ref 0–0)
PLATELET # BLD AUTO: 171 X10*3/UL (ref 150–450)
POTASSIUM SERPL-SCNC: 3.1 MMOL/L (ref 3.5–5.3)
PROT SERPL-MCNC: 5.6 G/DL (ref 6.4–8.2)
PROT UR-ACNC: 9 MG/DL (ref 5–24)
PROT/CREAT UR: 0.19 MG/MG CREAT (ref 0–0.17)
RBC # BLD AUTO: 3.21 X10*6/UL (ref 4–5.2)
RH FACTOR (ANTIGEN D): NORMAL
SODIUM SERPL-SCNC: 139 MMOL/L (ref 136–145)
URATE SERPL-MCNC: 4.7 MG/DL (ref 2.3–6.7)
WBC # BLD AUTO: 5.6 X10*3/UL (ref 4.4–11.3)

## 2023-12-27 PROCEDURE — 84075 ASSAY ALKALINE PHOSPHATASE: CPT | Performed by: OBSTETRICS & GYNECOLOGY

## 2023-12-27 PROCEDURE — 99214 OFFICE O/P EST MOD 30 MIN: CPT | Performed by: OBSTETRICS & GYNECOLOGY

## 2023-12-27 PROCEDURE — 82570 ASSAY OF URINE CREATININE: CPT | Performed by: OBSTETRICS & GYNECOLOGY

## 2023-12-27 PROCEDURE — 85025 COMPLETE CBC W/AUTO DIFF WBC: CPT | Performed by: OBSTETRICS & GYNECOLOGY

## 2023-12-27 PROCEDURE — 84550 ASSAY OF BLOOD/URIC ACID: CPT | Performed by: OBSTETRICS & GYNECOLOGY

## 2023-12-27 PROCEDURE — 99215 OFFICE O/P EST HI 40 MIN: CPT | Performed by: STUDENT IN AN ORGANIZED HEALTH CARE EDUCATION/TRAINING PROGRAM

## 2023-12-27 PROCEDURE — 86850 RBC ANTIBODY SCREEN: CPT | Performed by: OBSTETRICS & GYNECOLOGY

## 2023-12-27 PROCEDURE — 99213 OFFICE O/P EST LOW 20 MIN: CPT | Mod: 25

## 2023-12-27 PROCEDURE — 36415 COLL VENOUS BLD VENIPUNCTURE: CPT | Performed by: OBSTETRICS & GYNECOLOGY

## 2023-12-27 RX ORDER — NIFEDIPINE 10 MG/1
10 CAPSULE ORAL ONCE AS NEEDED
Status: DISCONTINUED | OUTPATIENT
Start: 2023-12-27 | End: 2023-12-27 | Stop reason: HOSPADM

## 2023-12-27 RX ORDER — LIDOCAINE HYDROCHLORIDE 10 MG/ML
0.5 INJECTION INFILTRATION; PERINEURAL ONCE AS NEEDED
Status: DISCONTINUED | OUTPATIENT
Start: 2023-12-27 | End: 2023-12-27 | Stop reason: HOSPADM

## 2023-12-27 RX ORDER — LABETALOL HYDROCHLORIDE 5 MG/ML
20 INJECTION, SOLUTION INTRAVENOUS ONCE AS NEEDED
Status: DISCONTINUED | OUTPATIENT
Start: 2023-12-27 | End: 2023-12-27 | Stop reason: HOSPADM

## 2023-12-27 RX ORDER — PEN NEEDLE, DIABETIC 32GX 5/32"
1 NEEDLE, DISPOSABLE MISCELLANEOUS 4 TIMES DAILY
Qty: 200 EACH | Refills: 4 | Status: SHIPPED | OUTPATIENT
Start: 2023-12-27 | End: 2023-12-29

## 2023-12-27 RX ORDER — HYDRALAZINE HYDROCHLORIDE 20 MG/ML
5 INJECTION INTRAMUSCULAR; INTRAVENOUS ONCE AS NEEDED
Status: DISCONTINUED | OUTPATIENT
Start: 2023-12-27 | End: 2023-12-27 | Stop reason: HOSPADM

## 2023-12-27 RX ORDER — CONTAINER,EMPTY
1 EACH MISCELLANEOUS AS NEEDED
Qty: 1 EACH | Refills: 4 | Status: ON HOLD | OUTPATIENT
Start: 2023-12-27 | End: 2024-02-17 | Stop reason: WASHOUT

## 2023-12-27 RX ORDER — ONDANSETRON 4 MG/1
4 TABLET, FILM COATED ORAL EVERY 6 HOURS PRN
Status: DISCONTINUED | OUTPATIENT
Start: 2023-12-27 | End: 2023-12-27 | Stop reason: HOSPADM

## 2023-12-27 RX ORDER — ONDANSETRON HYDROCHLORIDE 2 MG/ML
4 INJECTION, SOLUTION INTRAVENOUS EVERY 6 HOURS PRN
Status: DISCONTINUED | OUTPATIENT
Start: 2023-12-27 | End: 2023-12-27 | Stop reason: HOSPADM

## 2023-12-27 SDOH — HEALTH STABILITY: MENTAL HEALTH: HAVE YOU USED ANY SUBSTANCES (CANABIS, COCAINE, HEROIN, HALLUCINOGENS, INHALANTS, ETC.) IN THE PAST 12 MONTHS?: NO

## 2023-12-27 SDOH — HEALTH STABILITY: MENTAL HEALTH: NON-SPECIFIC ACTIVE SUICIDAL THOUGHTS (PAST 1 MONTH): NO

## 2023-12-27 SDOH — SOCIAL STABILITY: SOCIAL INSECURITY: HAVE YOU HAD THOUGHTS OF HARMING ANYONE ELSE?: YES

## 2023-12-27 SDOH — HEALTH STABILITY: MENTAL HEALTH: WISH TO BE DEAD (PAST 1 MONTH): NO

## 2023-12-27 SDOH — ECONOMIC STABILITY: HOUSING INSECURITY: DO YOU FEEL UNSAFE GOING BACK TO THE PLACE WHERE YOU ARE LIVING?: NO

## 2023-12-27 SDOH — HEALTH STABILITY: MENTAL HEALTH: STRENGTHS (MUST CHOOSE TWO): SUPPORT FROM ORGANIZED COMMUNITY

## 2023-12-27 SDOH — SOCIAL STABILITY: SOCIAL INSECURITY: VERBAL ABUSE: DENIES

## 2023-12-27 SDOH — SOCIAL STABILITY: SOCIAL INSECURITY: DOES ANYONE TRY TO KEEP YOU FROM HAVING/CONTACTING OTHER FRIENDS OR DOING THINGS OUTSIDE YOUR HOME?: NO

## 2023-12-27 SDOH — SOCIAL STABILITY: SOCIAL INSECURITY: PHYSICAL ABUSE: DENIES

## 2023-12-27 SDOH — SOCIAL STABILITY: SOCIAL INSECURITY: ARE THERE ANY APPARENT SIGNS OF INJURIES/BEHAVIORS THAT COULD BE RELATED TO ABUSE/NEGLECT?: NO

## 2023-12-27 SDOH — SOCIAL STABILITY: SOCIAL INSECURITY: HAS ANYONE EVER THREATENED TO HURT YOUR FAMILY OR YOUR PETS?: NO

## 2023-12-27 SDOH — SOCIAL STABILITY: SOCIAL INSECURITY: ABUSE SCREEN: ADULT

## 2023-12-27 SDOH — HEALTH STABILITY: MENTAL HEALTH: HAVE YOU USED ANY PRESCRIPTION DRUGS OTHER THAN PRESCRIBED IN THE PAST 12 MONTHS?: NO

## 2023-12-27 SDOH — SOCIAL STABILITY: SOCIAL INSECURITY: ARE YOU OR HAVE YOU BEEN THREATENED OR ABUSED PHYSICALLY, EMOTIONALLY, OR SEXUALLY BY ANYONE?: NO

## 2023-12-27 SDOH — HEALTH STABILITY: MENTAL HEALTH: WERE YOU ABLE TO COMPLETE ALL THE BEHAVIORAL HEALTH SCREENINGS?: YES

## 2023-12-27 SDOH — SOCIAL STABILITY: SOCIAL INSECURITY: DO YOU FEEL ANYONE HAS EXPLOITED OR TAKEN ADVANTAGE OF YOU FINANCIALLY OR OF YOUR PERSONAL PROPERTY?: NO

## 2023-12-27 SDOH — HEALTH STABILITY: MENTAL HEALTH: SUICIDAL BEHAVIOR (LIFETIME): NO

## 2023-12-27 ASSESSMENT — PATIENT HEALTH QUESTIONNAIRE - PHQ9
2. FEELING DOWN, DEPRESSED OR HOPELESS: NOT AT ALL
SUM OF ALL RESPONSES TO PHQ9 QUESTIONS 1 & 2: 0
1. LITTLE INTEREST OR PLEASURE IN DOING THINGS: NOT AT ALL

## 2023-12-27 ASSESSMENT — ACTIVITIES OF DAILY LIVING (ADL)
DRESSING YOURSELF: INDEPENDENT
LACK_OF_TRANSPORTATION: NO
TOILETING: INDEPENDENT
WALKS IN HOME: INDEPENDENT
BATHING: INDEPENDENT
HEARING - LEFT EAR: FUNCTIONAL
ADEQUATE_TO_COMPLETE_ADL: YES
GROOMING: INDEPENDENT
JUDGMENT_ADEQUATE_SAFELY_COMPLETE_DAILY_ACTIVITIES: YES
PATIENT'S MEMORY ADEQUATE TO SAFELY COMPLETE DAILY ACTIVITIES?: YES
FEEDING YOURSELF: INDEPENDENT
HEARING - RIGHT EAR: FUNCTIONAL

## 2023-12-27 ASSESSMENT — LIFESTYLE VARIABLES
HOW OFTEN DO YOU HAVE A DRINK CONTAINING ALCOHOL: NEVER
HOW MANY STANDARD DRINKS CONTAINING ALCOHOL DO YOU HAVE ON A TYPICAL DAY: PATIENT DOES NOT DRINK
AUDIT-C TOTAL SCORE: 0
AUDIT-C TOTAL SCORE: 0
HOW OFTEN DO YOU HAVE 6 OR MORE DRINKS ON ONE OCCASION: NEVER
SKIP TO QUESTIONS 9-10: 1

## 2023-12-27 ASSESSMENT — PAIN SCALES - GENERAL
PAINLEVEL: 0 - NO PAIN
PAINLEVEL_OUTOF10: 0 - NO PAIN
PAINLEVEL_OUTOF10: 0 - NO PAIN

## 2023-12-27 NOTE — PROGRESS NOTES
Pt. To go to L&D for monitoring and labs d/t bp 162/82. Pt. Declines going to main campus.  Pt. Will go to [facility] after she takes her son to a  Apt.  Pt. Aware of risks to herself and fetus. Report called into [facility] L&D to yeny Walker.

## 2023-12-27 NOTE — H&P
Obstetrical Triage History and Physical     Jackelin Umana is a 48 y.o. . 30w3d seen at [facility] office today for consult for gest  diabetes and had elevated /82    Chief Complaint: Hypertension (Elevated BP in the office)    Assessment/Plan    All BPs here non severe.   HELLP labs wnl. T/P = 0.19    Continue home BP monitoring, keep next OB appt. discharge home.        Active Problems:  There are no active Hospital Problems.      Pregnancy Problems (from 23 to present)       Problem Noted Resolved    Gestational diabetes mellitus (GDM) in second trimester 2023 by Eri Lewis MD No    Priority:  Medium      History of postpartum hemorrhage 2023 by Eri Lewis MD No    Priority:  Medium      Anemia affecting pregnancy in third trimester 12/10/2023 by LIZZIE Mitchell No    Priority:  Medium      Overview Signed 12/10/2023 11:27 AM by LIZZIE Mitchell     Iron bid  Recheck labs 2 weeks         High risk pregnancy, multigravida of advanced maternal age in second trimester 10/9/2023 by LIZZIE Marin No    Priority:  Medium      Overview Addendum 10/9/2023 10:21 PM by LIZZIE Marin     AMA >40 at [facility]  S/p rr NIPS  <>Growth US at 30 and 36 weeks  <> BPP or NST weekly  beginning at 36 weeks           Chronic hypertension affecting pregnancy 10/9/2023 by LIZZIE Marin No    Priority:  Medium      Overview Addendum 10/9/2023 10:08 PM by LIZZIE Marin     Chronic HTN not on meds  Growth US at 30 and 36 weeks  Delivery between 38.0-39.6               Subjective   Jackelin is here complaining of high blood pressure. Good fetal movement. Denies vaginal bleeding., Denies contractions., Denies leaking of fluid.  Home BPs have been wnl. Has No HA or chest pain. No swelling. Baby moving well.           Obstetrical History   OB History    Para Term  AB Living   11 7 7   3 7   SAB IAB Ectopic Multiple  "Live Births   3       7      # Outcome Date GA Lbr Alexandro/2nd Weight Sex Delivery Anes PTL Lv   11 Current            10 SAB 2022 7w0d          9 Term 12/31/18 40w0d  4082 g F Vag-Spont None  DAJA      Complications: Shoulder Dystocia   8 Term 09/08/16 39w0d  4196 g F Vag-Spont None  DAJA   7 Term 03/05/14 38w5d  4139 g F Vag-Spont EPI  DAJA   6 Term 02/10/12 39w0d  4082 g M Vag-Spont EPI  DAJA   5 Term 11/03/08 39w0d  4167 g F Vag-Spont None  DAJA   4 Term 02/08/07 39w0d  4082 g M Vag-Spont EPI  DAJA   3 Term 01/18/05 38w0d  4111 g M Vag-Spont EPI  DAJA   2 SAB  6w0d          1 SAB  6w0d              Past Medical History  Past Medical History:   Diagnosis Date    Gestational diabetes     Postpartum hypertension         Past Surgical History   History reviewed. No pertinent surgical history.    Social History  Social History     Tobacco Use    Smoking status: Former     Types: Cigarettes    Smokeless tobacco: Never   Substance Use Topics    Alcohol use: Not Currently     Substance and Sexual Activity   Drug Use Never       Allergies  Patient has no known allergies.     Medications  Medications Prior to Admission   Medication Sig Dispense Refill Last Dose    alcohol swabs pads, medicated Test 4 times daily and as needed 100 each 2     aspirin 81 mg EC tablet Take 2 tablets (162 mg) by mouth once daily.       blood sugar diagnostic strip Test 4 times daily and as needed 112 strip 4     ferrous sulfate, 325 mg ferrous sulfate, (FerrouSuL) tablet Take 1 tablet by mouth once daily with a meal. 30 tablet 3     FreeStyle glucose monitoring (FreeStyle Lite Meter) kit 1 each 4 times a day. 1 each 0     insulin NPH, Isophane, (HumuLIN N,NovoLIN N) 100 unit/mL (3 mL) injection Inject 10 Units under the skin once daily at bedtime. 5 each 4     lancets misc TEST 4 TIMES DAILY AND AS NEEDED 100 each 2     pen needle, diabetic (Comfort EZ Pen Needles) 32 gauge x 5/16\" needle 1 each 4 times a day. 200 each 4     prenatal no122/iron/folic " acid (PRENATAL MULTI ORAL) Take by mouth once every day.   12/27/2023 at 1130    Sharps Container 1 each if needed (Dispose of sharps in container). 1 each 4        Objective    Last Vitals  Temp Pulse Resp BP MAP O2 Sat   36.8 °C (98.2 °F) 91 18 (!) 145/65   97 %     Physical Examination  GENERAL: Examination reveals a well developed, well nourished, gravid female in no acute distress. She is alert and cooperative.  FHR is 150  , with Accelerations, and a  Cat 1  tracing.    Tenkiller reading:  no ctxs   PSYCHOLOGICAL: awake and alert; oriented to person, place, and time    Lab Review  Labs in chart were reviewed.      Eri Lewis MD

## 2023-12-27 NOTE — PROGRESS NOTES
Jackelin Umana is a 49 yo  @ 30w3d who presents for a consultation for possible chronic hypertension and newly diagnosed gestational diabetes.     Jackelin was recently seen in OB Triage on 23 for mild-range blood pressures. On chart review it appears that most of her blood pressure have been appropriate prior to 20 weeks gestation, however, one prenatal visit states she was given the diagnosis of chronic hypertension prior to 19 weeks based on elevated diastolic Bps (not available in chart). She is not currently on medications and reports she did not know that she had chronic hypertension. Today she denies headaches, visual changes or RUQ pain.     Jackelin had an elevated 1 hour glucola of 150 mg/dL followed by an abnormal 3 hr gtt. She has been checking her BG four times daily and reports the following values over the last 5 days:    Fastin/5 elevated ()  Breakfast/Lunch/Dinner: 0/15 elevated    Jackelin is otherwise doing well and denies leakage of fluid or vaginal bleeding. She reports good fetal movement.    She had an ultrasound yesterday that showed an estimated fetal weight of 1958 g (95%) with an TATIANNA of 17.6 cm.    PMHX: ?cHTN  PSHX: tonsillectomy  OBHX: , 7 term vaginal deliveries, 3 miscarriages. Last pregnancy complicated by shoulder dystocia. GDM in two prior pregnancies  GYN: denies STIs  MEDS: PNV, ldASA, magnesium, iron  ALL: NKDA  SOCIAL: denies tobacco/alcohol/illicit drug use  FAMILY: mother and father - HTN, cousins - HTN    O: /90   Wt 93 kg (205 lb)   BMI 31.17 kg/m²   Gen: NAD  Resp: nonlabored breathing  Cardiac: good peripheral perfusion  Abd: gravid  Psych: appropriate mood and affect  FHTs: 140s    Jackelin Umana is a 49 yo  @ 30w3d who presents for a consultation for possible chronic hypertension and newly diagnosed gestational diabetes.    *cHTN  We discussed the implications of cHTN during pregnancy including the increased risk of pre-eclampsia,  MI, stroke, as well as placental abruption, FGR and stillbirth. We discussed that during pregnancy, blood pressure is often lower in the first and second trimesters before returning to their baseline values in the third trimester and that blood pressure medication may need to be titrated as gestation advances. Antihypertensive medications cross the placenta but are generally considered safe with first line medications being labetalol and nifedipine due to their efficacy and safety data. Generally, blood pressure targets are <140 systolic and <90 diastolic. However careful attention should be paid to the potential development of pre-eclampsia if increased doses in medication are required. I discussed that it is not entirely clear to me whether Jackelin has chronic hypertension or gestational hypertension due to the limited data available. Today Jackelin's blood pressure was elevated to 156/90 with repeat 162/82. I recommended she present to OB Triage to rule out preeclampsia. If she is discharged to home I recommend the following:    -Twice weekly  testing (Monday BPP, Thursday NST)  -Weekly CBC, CMP  -Weekly visits  -Growth ultrasounds every 3-4 weeks  -Delivery at 37w0d    *Gestational Diabetes:  1. The significance and management of gestational diabetes in pregnancy were reviewed. We also discussed maternal and  risks including preeclampsia, macrosomia, birth trauma,  delivery,  hypoglycemia and hyperbilirubinemia, stillbirth and risk for the subsequent development of type-2 diabetes mellitus.    2. Diabetes education and nutritional counseling were previously provided in Jackelin's prior pregnancies. She was instructed on dietary modification and blood glucose monitoring. We recommend glucometer testing four times daily (fasting and 1 hour post-prandials).    RECOMMENDATIONS:     1. Glycemic control: Jackelin should target glucose ranges of fastings 60-95 mg/dL and 1 hour  post-prandial values less than 140 mg/dL.    2. Therapy: I reviewed Jackelin's glycemic profile today. Overall, her fasting values are elevated, thus medical therapy is recommended. I discussed starting 10 units of NPH at nighttime.    3. Antepartum testing should include daily fetal kick counts and twice weekly  testing as described above.    4. Timing of delivery may be planned for 37 weeks. Route of delivery and timing may also depend on ultrasound estimation of fetal weight and obstetrical history. We would be happy to see Jackelin in the Maternal Fetal Medicine office for her 34 week fetal growth ultrasound as well as a return consultation to assist with delivery planning.    5. Postpartum: Jackelin has a 50% risk for the development of type-2 diabetes mellitus within the next 10-15 years. We recommend a 75-g glucose tolerance test 8-12 weeks postpartum and then for the patient to have periodic evaluation of this risk by her primary care physician.    *Prior Shoulder Dystocia:  We discussed that there is an increased risk of a recurrent shoulder dystocia in subsequent pregnancies (up to ~16%). We discussed the increased risk of a fetal hypoxic brain injury in the setting of a shoulder dystocia. Jackelin understands she can always opt for a primary  delivery.    FOLLOWUP PLAN: Jackelin will be following up with you for her continued prenatal care. She will be working with us to manage her blood sugars and medication regimen. We will plan on seeing her back for a followup growth ultrasound and return consultation for delivery planning at around 34 weeks.    We appreciate the opportunity to participate in Jackelin's care and look forward to working with her to manage her diabetes. Please contact us if you have any additional questions or concerns.    Misha Cruz MD  Maternal-Fetal Medicine

## 2024-01-02 ENCOUNTER — PATIENT MESSAGE (OUTPATIENT)
Dept: MATERNAL FETAL MEDICINE | Facility: CLINIC | Age: 49
End: 2024-01-02
Payer: COMMERCIAL

## 2024-01-02 RX ORDER — PEN NEEDLE, DIABETIC 30 GX3/16"
1 NEEDLE, DISPOSABLE MISCELLANEOUS NIGHTLY
Qty: 100 EACH | Refills: 2 | Status: ON HOLD | OUTPATIENT
Start: 2024-01-02 | End: 2024-02-17 | Stop reason: WASHOUT

## 2024-01-03 ENCOUNTER — ANCILLARY PROCEDURE (OUTPATIENT)
Dept: RADIOLOGY | Facility: CLINIC | Age: 49
End: 2024-01-03
Payer: COMMERCIAL

## 2024-01-03 DIAGNOSIS — O10.919 CHRONIC HYPERTENSION AFFECTING PREGNANCY (HHS-HCC): ICD-10-CM

## 2024-01-03 PROCEDURE — 76819 FETAL BIOPHYS PROFIL W/O NST: CPT

## 2024-01-03 PROCEDURE — 76819 FETAL BIOPHYS PROFIL W/O NST: CPT | Performed by: OBSTETRICS & GYNECOLOGY

## 2024-01-04 ENCOUNTER — ROUTINE PRENATAL (OUTPATIENT)
Dept: OBSTETRICS AND GYNECOLOGY | Facility: CLINIC | Age: 49
End: 2024-01-04
Payer: COMMERCIAL

## 2024-01-04 VITALS — DIASTOLIC BLOOD PRESSURE: 82 MMHG | SYSTOLIC BLOOD PRESSURE: 138 MMHG | WEIGHT: 204 LBS | BODY MASS INDEX: 31.02 KG/M2

## 2024-01-04 DIAGNOSIS — O09.522 HIGH RISK PREGNANCY, MULTIGRAVIDA OF ADVANCED MATERNAL AGE IN SECOND TRIMESTER (HHS-HCC): ICD-10-CM

## 2024-01-04 DIAGNOSIS — O24.414 INSULIN CONTROLLED GESTATIONAL DIABETES MELLITUS (GDM) IN SECOND TRIMESTER (HHS-HCC): Primary | ICD-10-CM

## 2024-01-04 DIAGNOSIS — Z87.59 HISTORY OF POSTPARTUM HEMORRHAGE: ICD-10-CM

## 2024-01-04 DIAGNOSIS — Z87.59 HISTORY OF SHOULDER DYSTOCIA IN PRIOR PREGNANCY: ICD-10-CM

## 2024-01-04 DIAGNOSIS — O10.919 CHRONIC HYPERTENSION AFFECTING PREGNANCY (HHS-HCC): ICD-10-CM

## 2024-01-04 DIAGNOSIS — Z64.1 GRAND MULTIPARA: ICD-10-CM

## 2024-01-04 DIAGNOSIS — O99.013 ANEMIA AFFECTING PREGNANCY IN THIRD TRIMESTER (HHS-HCC): ICD-10-CM

## 2024-01-04 PROCEDURE — 0501F PRENATAL FLOW SHEET: CPT | Performed by: OBSTETRICS & GYNECOLOGY

## 2024-01-04 NOTE — PROGRESS NOTES
Needs increase iron , has Rx     On 10 units insulin at bedtime sugars wnl.    Medical Problems       Problem List       High risk pregnancy, multigravida of advanced maternal age in second trimester    Overview Addendum 2024 12:09 PM by Eri Lewis MD     AMA >40 at LURDES  S/p rr NIPS  <>Growth US at 30 wks= 95 %  follow up at  36 weeks             Chronic hypertension affecting pregnancy    Overview Addendum 10/9/2023 10:08 PM by LIZZIE Marin     Chronic HTN not on meds  Delivery between 38.0-39.6         History of shoulder dystocia in prior pregnancy    Overview Addendum 10/9/2023 10:20 PM by LIZZIE Marin     Physician counseling in third trimester for hx of shoulder dystocia  Will see what EFW is   95% at 30 wks   Shoulder dystocia in last pregnancy @ 9# 0oz, proven to 9# 4 oz.       Grand multipara    Overview Addendum 10/9/2023 10:20 PM by LIZZIE Marin       11   HX of Pre-e, gHTN- on ASA;    Declines flu and COVID           Anemia affecting pregnancy in third trimester    Overview Signed 12/10/2023 11:27 AM by LIZZIE Mitchell     Iron bid was not taking Rx tablets , will start   Recheck labs 2 weeks         Gestational diabetes mellitus (GDM) in second trimester    Overview Signed 2024 12:06 PM by Eri Lewis MD     On insulin 10 unit at pm   BPP weekly w NST at 32 weeks          History of postpartum hemorrhage           Eri Lewis MD

## 2024-01-08 ENCOUNTER — ANCILLARY PROCEDURE (OUTPATIENT)
Dept: RADIOLOGY | Facility: CLINIC | Age: 49
End: 2024-01-08
Payer: COMMERCIAL

## 2024-01-08 ENCOUNTER — PATIENT MESSAGE (OUTPATIENT)
Dept: MATERNAL FETAL MEDICINE | Facility: CLINIC | Age: 49
End: 2024-01-08
Payer: COMMERCIAL

## 2024-01-08 DIAGNOSIS — Z34.90 ENCOUNTER FOR SUPERVISION OF NORMAL PREGNANCY, UNSPECIFIED, UNSPECIFIED TRIMESTER (HHS-HCC): ICD-10-CM

## 2024-01-08 PROCEDURE — 76819 FETAL BIOPHYS PROFIL W/O NST: CPT

## 2024-01-08 PROCEDURE — 76819 FETAL BIOPHYS PROFIL W/O NST: CPT | Performed by: OBSTETRICS & GYNECOLOGY

## 2024-01-12 ENCOUNTER — PROCEDURE VISIT (OUTPATIENT)
Dept: OBSTETRICS AND GYNECOLOGY | Facility: CLINIC | Age: 49
End: 2024-01-12
Payer: COMMERCIAL

## 2024-01-12 ENCOUNTER — APPOINTMENT (OUTPATIENT)
Dept: OBSTETRICS AND GYNECOLOGY | Facility: CLINIC | Age: 49
End: 2024-01-12
Payer: COMMERCIAL

## 2024-01-12 VITALS — WEIGHT: 200.8 LBS | SYSTOLIC BLOOD PRESSURE: 140 MMHG | BODY MASS INDEX: 30.53 KG/M2 | DIASTOLIC BLOOD PRESSURE: 70 MMHG

## 2024-01-12 DIAGNOSIS — Z87.59 HISTORY OF SHOULDER DYSTOCIA IN PRIOR PREGNANCY: ICD-10-CM

## 2024-01-12 DIAGNOSIS — O09.522 HIGH RISK PREGNANCY, MULTIGRAVIDA OF ADVANCED MATERNAL AGE IN SECOND TRIMESTER (HHS-HCC): ICD-10-CM

## 2024-01-12 DIAGNOSIS — O24.414 INSULIN CONTROLLED GESTATIONAL DIABETES MELLITUS (GDM) IN SECOND TRIMESTER (HHS-HCC): ICD-10-CM

## 2024-01-12 DIAGNOSIS — O10.919 CHRONIC HYPERTENSION AFFECTING PREGNANCY (HHS-HCC): ICD-10-CM

## 2024-01-12 DIAGNOSIS — Z64.1 GRAND MULTIPARA: ICD-10-CM

## 2024-01-12 DIAGNOSIS — Z3A.32 32 WEEKS GESTATION OF PREGNANCY (HHS-HCC): ICD-10-CM

## 2024-01-12 DIAGNOSIS — O09.523 MULTIGRAVIDA OF ADVANCED MATERNAL AGE IN THIRD TRIMESTER (HHS-HCC): Primary | ICD-10-CM

## 2024-01-12 LAB
ALBUMIN SERPL BCP-MCNC: 3.5 G/DL (ref 3.4–5)
ALP SERPL-CCNC: 140 U/L (ref 33–110)
ALT SERPL W P-5'-P-CCNC: 11 U/L (ref 7–45)
ANION GAP SERPL CALC-SCNC: 11 MMOL/L (ref 10–20)
AST SERPL W P-5'-P-CCNC: 13 U/L (ref 9–39)
BILIRUB SERPL-MCNC: 0.4 MG/DL (ref 0–1.2)
BUN SERPL-MCNC: 12 MG/DL (ref 6–23)
CALCIUM SERPL-MCNC: 8.8 MG/DL (ref 8.6–10.3)
CHLORIDE SERPL-SCNC: 108 MMOL/L (ref 98–107)
CO2 SERPL-SCNC: 23 MMOL/L (ref 21–32)
CREAT SERPL-MCNC: 0.57 MG/DL (ref 0.5–1.05)
CREAT UR-MCNC: 80.8 MG/DL (ref 20–320)
EGFRCR SERPLBLD CKD-EPI 2021: >90 ML/MIN/1.73M*2
ERYTHROCYTE [DISTWIDTH] IN BLOOD BY AUTOMATED COUNT: 16.7 % (ref 11.5–14.5)
GLUCOSE SERPL-MCNC: 83 MG/DL (ref 74–99)
HCT VFR BLD AUTO: 34.2 % (ref 36–46)
HGB BLD-MCNC: 11.1 G/DL (ref 12–16)
MCH RBC QN AUTO: 30.2 PG (ref 26–34)
MCHC RBC AUTO-ENTMCNC: 32.5 G/DL (ref 32–36)
MCV RBC AUTO: 93 FL (ref 80–100)
NRBC BLD-RTO: 0 /100 WBCS (ref 0–0)
PLATELET # BLD AUTO: 187 X10*3/UL (ref 150–450)
POTASSIUM SERPL-SCNC: 3.8 MMOL/L (ref 3.5–5.3)
PROT SERPL-MCNC: 5.8 G/DL (ref 6.4–8.2)
PROT UR-ACNC: 11 MG/DL (ref 5–24)
PROT/CREAT UR: 0.14 MG/MG CREAT (ref 0–0.17)
RBC # BLD AUTO: 3.68 X10*6/UL (ref 4–5.2)
SODIUM SERPL-SCNC: 138 MMOL/L (ref 136–145)
WBC # BLD AUTO: 5.7 X10*3/UL (ref 4.4–11.3)

## 2024-01-12 PROCEDURE — 80053 COMPREHEN METABOLIC PANEL: CPT

## 2024-01-12 PROCEDURE — 82570 ASSAY OF URINE CREATININE: CPT

## 2024-01-12 PROCEDURE — 99214 OFFICE O/P EST MOD 30 MIN: CPT | Performed by: OBSTETRICS & GYNECOLOGY

## 2024-01-12 PROCEDURE — 36415 COLL VENOUS BLD VENIPUNCTURE: CPT

## 2024-01-12 PROCEDURE — 84156 ASSAY OF PROTEIN URINE: CPT

## 2024-01-12 PROCEDURE — 59025 FETAL NON-STRESS TEST: CPT | Performed by: OBSTETRICS & GYNECOLOGY

## 2024-01-12 PROCEDURE — 85027 COMPLETE CBC AUTOMATED: CPT

## 2024-01-12 NOTE — PROCEDURES
Jackelin Umana, a  at 32w5d with an LURDES of 3/3/2024, by Ultrasound, was seen at [facility] for a nonstress test.    Non-Stress Test   Baseline Fetal Heart Rate for Non-Stress Test: 135 BPM  Variability in Waveform for Non-Stress Test: Moderate  Accelerations in Non-Stress Test: Yes  Decelerations in Non-Stress Test: None  Contractions in Non-Stress Test: Not present  Acoustic Stimulator for Non-Stress Test: No  Interpretation of Non-Stress Test   Interpretation of Non-Stress Test: Reactive

## 2024-01-13 LAB — REFLEX ADDED, ANEMIA PANEL: NORMAL

## 2024-01-15 ENCOUNTER — PATIENT MESSAGE (OUTPATIENT)
Dept: MATERNAL FETAL MEDICINE | Facility: CLINIC | Age: 49
End: 2024-01-15
Payer: COMMERCIAL

## 2024-01-15 NOTE — PROGRESS NOTES
Routine prenatal visit   No complaints today.  Reports Bgs still well controlled on current insulin (10 units at bedtime).  Normal FM.  Reactive NST today.  Continue twice weekly testing.  Weekly HELLP labs sent today per [facility].     Medical Problems       Problem List       High risk pregnancy, multigravida of advanced maternal age in second trimester    Overview Addendum 1/15/2024  5:57 AM by Lorena Boucher MD     AMA >40 at LURDES  S/p rr NIPS  - Growth 30 weeks - 95%  <> Growth USN 36 weeks   <> BPP or NST weekly            Chronic hypertension affecting pregnancy    Overview Addendum 10/9/2023 10:08 PM by LIZZIE Marin     Chronic HTN not on meds  Growth US at 30 and 36 weeks  Delivery between 38.0-39.6         History of shoulder dystocia in prior pregnancy    Overview Addendum 2024  2:01 PM by Eri Lewis MD     Physician counseling in third trimester for hx of shoulder dystocia  Shoulder dystocia in last pregnancy @ 9# 0oz, proven to 9# 4 oz.            Grand multipara    Overview Addendum 2024  2:03 PM by Eri Lewis MD       12  on ASA; PPH in first pregnancy    Declines flu and COVID           Anemia affecting pregnancy in third trimester    Overview Signed 12/10/2023 11:27 AM by LIZZIE Mitchell     Iron bid  Recheck labs 2 weeks         Gestational diabetes mellitus (GDM) in second trimester    Overview Addendum 2024  9:55 AM by Chandni Mahajan RN     On insulin 10 unit at pm   2024 : NPH 10 at bedtime           History of postpartum hemorrhage        Follow up in 1 week(s).

## 2024-01-16 ENCOUNTER — ANCILLARY PROCEDURE (OUTPATIENT)
Dept: RADIOLOGY | Facility: CLINIC | Age: 49
End: 2024-01-16
Payer: COMMERCIAL

## 2024-01-16 DIAGNOSIS — Z34.90 ENCOUNTER FOR SUPERVISION OF NORMAL PREGNANCY, UNSPECIFIED, UNSPECIFIED TRIMESTER (HHS-HCC): ICD-10-CM

## 2024-01-16 DIAGNOSIS — O09.523 AMA (ADVANCED MATERNAL AGE) MULTIGRAVIDA 35+, THIRD TRIMESTER (HHS-HCC): ICD-10-CM

## 2024-01-16 DIAGNOSIS — O24.414 INSULIN CONTROLLED GESTATIONAL DIABETES MELLITUS (GDM) IN THIRD TRIMESTER (HHS-HCC): ICD-10-CM

## 2024-01-16 DIAGNOSIS — O10.919 CHRONIC HYPERTENSION AFFECTING PREGNANCY (HHS-HCC): ICD-10-CM

## 2024-01-16 PROCEDURE — 76819 FETAL BIOPHYS PROFIL W/O NST: CPT

## 2024-01-16 PROCEDURE — 76819 FETAL BIOPHYS PROFIL W/O NST: CPT | Performed by: MEDICAL GENETICS

## 2024-01-16 PROCEDURE — 76816 OB US FOLLOW-UP PER FETUS: CPT

## 2024-01-16 PROCEDURE — 76816 OB US FOLLOW-UP PER FETUS: CPT | Performed by: MEDICAL GENETICS

## 2024-01-19 ENCOUNTER — ROUTINE PRENATAL (OUTPATIENT)
Dept: OBSTETRICS AND GYNECOLOGY | Facility: CLINIC | Age: 49
End: 2024-01-19
Payer: COMMERCIAL

## 2024-01-19 VITALS — SYSTOLIC BLOOD PRESSURE: 144 MMHG | DIASTOLIC BLOOD PRESSURE: 82 MMHG | BODY MASS INDEX: 30.38 KG/M2 | WEIGHT: 199.8 LBS

## 2024-01-19 DIAGNOSIS — O24.410 DIET CONTROLLED GESTATIONAL DIABETES MELLITUS (GDM) IN SECOND TRIMESTER (HHS-HCC): ICD-10-CM

## 2024-01-19 DIAGNOSIS — O99.013 ANEMIA AFFECTING PREGNANCY IN THIRD TRIMESTER (HHS-HCC): ICD-10-CM

## 2024-01-19 DIAGNOSIS — O09.523 MULTIGRAVIDA OF ADVANCED MATERNAL AGE IN THIRD TRIMESTER (HHS-HCC): ICD-10-CM

## 2024-01-19 DIAGNOSIS — Z87.59 HISTORY OF POSTPARTUM HEMORRHAGE: ICD-10-CM

## 2024-01-19 DIAGNOSIS — O10.919 CHRONIC HYPERTENSION AFFECTING PREGNANCY (HHS-HCC): ICD-10-CM

## 2024-01-19 DIAGNOSIS — O09.523 HIGH RISK PREGNANCY, MULTIGRAVIDA OF ADVANCED MATERNAL AGE IN THIRD TRIMESTER (HHS-HCC): ICD-10-CM

## 2024-01-19 LAB
ALBUMIN SERPL BCP-MCNC: 3.6 G/DL (ref 3.4–5)
ALP SERPL-CCNC: 141 U/L (ref 33–110)
ALT SERPL W P-5'-P-CCNC: 10 U/L (ref 7–45)
ANION GAP SERPL CALC-SCNC: 12 MMOL/L (ref 10–20)
AST SERPL W P-5'-P-CCNC: 11 U/L (ref 9–39)
BILIRUB SERPL-MCNC: 0.5 MG/DL (ref 0–1.2)
BUN SERPL-MCNC: 11 MG/DL (ref 6–23)
CALCIUM SERPL-MCNC: 9 MG/DL (ref 8.6–10.3)
CHLORIDE SERPL-SCNC: 105 MMOL/L (ref 98–107)
CO2 SERPL-SCNC: 24 MMOL/L (ref 21–32)
CREAT SERPL-MCNC: 0.49 MG/DL (ref 0.5–1.05)
CREAT UR-MCNC: 60.7 MG/DL (ref 20–320)
EGFRCR SERPLBLD CKD-EPI 2021: >90 ML/MIN/1.73M*2
ERYTHROCYTE [DISTWIDTH] IN BLOOD BY AUTOMATED COUNT: 16.6 % (ref 11.5–14.5)
GLUCOSE SERPL-MCNC: 80 MG/DL (ref 74–99)
HCT VFR BLD AUTO: 36.8 % (ref 36–46)
HGB BLD-MCNC: 11.6 G/DL (ref 12–16)
MCH RBC QN AUTO: 30 PG (ref 26–34)
MCHC RBC AUTO-ENTMCNC: 31.5 G/DL (ref 32–36)
MCV RBC AUTO: 95 FL (ref 80–100)
NRBC BLD-RTO: 0 /100 WBCS (ref 0–0)
PLATELET # BLD AUTO: 167 X10*3/UL (ref 150–450)
POTASSIUM SERPL-SCNC: 4.3 MMOL/L (ref 3.5–5.3)
PROT SERPL-MCNC: 6 G/DL (ref 6.4–8.2)
PROT UR-ACNC: 10 MG/DL (ref 5–24)
PROT/CREAT UR: 0.16 MG/MG CREAT (ref 0–0.17)
RBC # BLD AUTO: 3.87 X10*6/UL (ref 4–5.2)
REFLEX ADDED, ANEMIA PANEL: NORMAL
SODIUM SERPL-SCNC: 137 MMOL/L (ref 136–145)
URATE SERPL-MCNC: 4.9 MG/DL (ref 2.3–6.7)
WBC # BLD AUTO: 6.4 X10*3/UL (ref 4.4–11.3)

## 2024-01-19 PROCEDURE — 84550 ASSAY OF BLOOD/URIC ACID: CPT

## 2024-01-19 PROCEDURE — 0501F PRENATAL FLOW SHEET: CPT | Performed by: OBSTETRICS & GYNECOLOGY

## 2024-01-19 PROCEDURE — 85027 COMPLETE CBC AUTOMATED: CPT

## 2024-01-19 PROCEDURE — 84156 ASSAY OF PROTEIN URINE: CPT

## 2024-01-19 PROCEDURE — 82570 ASSAY OF URINE CREATININE: CPT

## 2024-01-19 PROCEDURE — 36415 COLL VENOUS BLD VENIPUNCTURE: CPT

## 2024-01-19 PROCEDURE — 59025 FETAL NON-STRESS TEST: CPT | Performed by: OBSTETRICS & GYNECOLOGY

## 2024-01-19 PROCEDURE — 80053 COMPREHEN METABOLIC PANEL: CPT

## 2024-01-19 NOTE — PROCEDURES
Jackelin Umana, a  at 33w5d with an LURDES of 3/3/2024, by Ultrasound, was seen at [facility] for a nonstress test.    Non-Stress Test   Baseline Fetal Heart Rate for Non-Stress Test: 135 BPM  Variability in Waveform for Non-Stress Test: Moderate  Accelerations in Non-Stress Test: Yes, lasting at least 15 seconds  Decelerations in Non-Stress Test: None  Contractions in Non-Stress Test: Not present  Acoustic Stimulator for Non-Stress Test: Yes  Interpretation of Non-Stress Test   Interpretation of Non-Stress Test: Reactive

## 2024-01-19 NOTE — PROGRESS NOTES
"Routine prenatal visit   No complaints today.  Thinks she may have to increase her insulin - fasting Bgs have seemed to go up this week.  Will send to [facility] on Monday. Bps remain normal at home.  Reviewed [facility] note with her indicating delivery at 37.0. Weekly HELLP labs done today.  Continue twice weekly  testing. NST today reactive.     Reviewed prior shoulder dystocia - the time was not clearly documented in the note.  She say she was going natural and the baby was OP so once she changed position, the shoulder delivered without any typical \"maneuvers.\"  She says this baby was smaller than previous babies.  She declines a  section with this pregnancy.     Medical Problems       Problem List       High risk pregnancy, multigravida of advanced maternal age in third trimester    Overview Addendum 2024 10:00 AM by Lorena Boucher MD     AMA >40 at LURDES  S/p rr NIPS  - Growth 33 weeks - 94%            Chronic hypertension affecting pregnancy    Overview Addendum 2024 10:01 AM by Lorena Boucher MD     - Chronic HTN not on meds  <> Deliver at 37.0 per [facility]   <> twice weekly monitoring - NST and BPP  <> weekly HELLP labs            History of shoulder dystocia in prior pregnancy    Overview Addendum 2024  1:18 PM by Lroena Boucher MD     - Shoulder dystocia in last pregnancy @ 9# 0oz, proven to 9# 4 oz.   - Unclear time of shoulder dystocia - delivered with cork screw and position change per CNM delivery note  - Declines primary c/s          Grand multipara    Overview Addendum 2024  2:03 PM by Eri Lewis MD       12  on ASA; PPH in first pregnancy    Declines flu and COVID           Anemia affecting pregnancy in third trimester    Overview Addendum 2024 10:01 AM by Lorena Boucher MD     - on oral iron          Gestational diabetes mellitus (GDM) in second trimester    Overview Addendum 1/15/2024  9:57 AM by Chandni Mahajan RN     On insulin 10 unit at " pm   1/8/2024 : NPH 10 at bedtime  1/15/2024 no change           History of postpartum hemorrhage        Follow up in 1 week(s).

## 2024-01-22 ENCOUNTER — APPOINTMENT (OUTPATIENT)
Dept: RADIOLOGY | Facility: CLINIC | Age: 49
End: 2024-01-22
Payer: COMMERCIAL

## 2024-01-22 ENCOUNTER — PATIENT MESSAGE (OUTPATIENT)
Dept: MATERNAL FETAL MEDICINE | Facility: CLINIC | Age: 49
End: 2024-01-22
Payer: COMMERCIAL

## 2024-01-23 ENCOUNTER — HOSPITAL ENCOUNTER (OUTPATIENT)
Dept: RADIOLOGY | Facility: CLINIC | Age: 49
Discharge: HOME | End: 2024-01-23
Payer: COMMERCIAL

## 2024-01-23 DIAGNOSIS — O24.419 GESTATIONAL DIABETES MELLITUS (GDM) IN THIRD TRIMESTER, GESTATIONAL DIABETES METHOD OF CONTROL UNSPECIFIED (HHS-HCC): ICD-10-CM

## 2024-01-23 PROCEDURE — 76819 FETAL BIOPHYS PROFIL W/O NST: CPT | Performed by: OBSTETRICS & GYNECOLOGY

## 2024-01-23 PROCEDURE — 76819 FETAL BIOPHYS PROFIL W/O NST: CPT

## 2024-01-25 ENCOUNTER — LAB (OUTPATIENT)
Dept: LAB | Facility: LAB | Age: 49
End: 2024-01-25
Payer: COMMERCIAL

## 2024-01-25 ENCOUNTER — ROUTINE PRENATAL (OUTPATIENT)
Dept: OBSTETRICS AND GYNECOLOGY | Facility: CLINIC | Age: 49
End: 2024-01-25
Payer: COMMERCIAL

## 2024-01-25 VITALS
SYSTOLIC BLOOD PRESSURE: 136 MMHG | BODY MASS INDEX: 30.45 KG/M2 | WEIGHT: 200.25 LBS | DIASTOLIC BLOOD PRESSURE: 80 MMHG

## 2024-01-25 DIAGNOSIS — O24.414 INSULIN CONTROLLED GESTATIONAL DIABETES MELLITUS (GDM) IN SECOND TRIMESTER (HHS-HCC): ICD-10-CM

## 2024-01-25 DIAGNOSIS — Z64.1 GRAND MULTIPARA: ICD-10-CM

## 2024-01-25 DIAGNOSIS — Z3A.34 34 WEEKS GESTATION OF PREGNANCY (HHS-HCC): ICD-10-CM

## 2024-01-25 DIAGNOSIS — O10.919 CHRONIC HYPERTENSION AFFECTING PREGNANCY (HHS-HCC): ICD-10-CM

## 2024-01-25 DIAGNOSIS — O10.919 CHRONIC HYPERTENSION AFFECTING PREGNANCY (HHS-HCC): Primary | ICD-10-CM

## 2024-01-25 DIAGNOSIS — Z87.59 HISTORY OF SHOULDER DYSTOCIA IN PRIOR PREGNANCY: ICD-10-CM

## 2024-01-25 DIAGNOSIS — O09.523 HIGH RISK PREGNANCY, MULTIGRAVIDA OF ADVANCED MATERNAL AGE IN THIRD TRIMESTER (HHS-HCC): ICD-10-CM

## 2024-01-25 LAB
ALBUMIN SERPL BCP-MCNC: 3.7 G/DL (ref 3.4–5)
ALP SERPL-CCNC: 159 U/L (ref 33–110)
ALT SERPL W P-5'-P-CCNC: 10 U/L (ref 7–45)
ANION GAP SERPL CALC-SCNC: 11 MMOL/L (ref 10–20)
AST SERPL W P-5'-P-CCNC: 11 U/L (ref 9–39)
BILIRUB SERPL-MCNC: 0.5 MG/DL (ref 0–1.2)
BUN SERPL-MCNC: 11 MG/DL (ref 6–23)
CALCIUM SERPL-MCNC: 9.6 MG/DL (ref 8.6–10.3)
CHLORIDE SERPL-SCNC: 104 MMOL/L (ref 98–107)
CO2 SERPL-SCNC: 26 MMOL/L (ref 21–32)
CREAT SERPL-MCNC: 0.57 MG/DL (ref 0.5–1.05)
CREAT UR-MCNC: 15 MG/DL (ref 20–320)
EGFRCR SERPLBLD CKD-EPI 2021: >90 ML/MIN/1.73M*2
ERYTHROCYTE [DISTWIDTH] IN BLOOD BY AUTOMATED COUNT: 15.9 % (ref 11.5–14.5)
GLUCOSE SERPL-MCNC: 83 MG/DL (ref 74–99)
HCT VFR BLD AUTO: 36.7 % (ref 36–46)
HGB BLD-MCNC: 11.9 G/DL (ref 12–16)
MCH RBC QN AUTO: 30.1 PG (ref 26–34)
MCHC RBC AUTO-ENTMCNC: 32.4 G/DL (ref 32–36)
MCV RBC AUTO: 93 FL (ref 80–100)
NRBC BLD-RTO: 0 /100 WBCS (ref 0–0)
PLATELET # BLD AUTO: 202 X10*3/UL (ref 150–450)
POTASSIUM SERPL-SCNC: 4.1 MMOL/L (ref 3.5–5.3)
PROT SERPL-MCNC: 6.4 G/DL (ref 6.4–8.2)
PROT UR-ACNC: <4 MG/DL (ref 5–24)
PROT/CREAT UR: ABNORMAL MG/G{CREAT}
RBC # BLD AUTO: 3.95 X10*6/UL (ref 4–5.2)
SODIUM SERPL-SCNC: 137 MMOL/L (ref 136–145)
WBC # BLD AUTO: 8.3 X10*3/UL (ref 4.4–11.3)

## 2024-01-25 PROCEDURE — 82570 ASSAY OF URINE CREATININE: CPT

## 2024-01-25 PROCEDURE — 59025 FETAL NON-STRESS TEST: CPT | Performed by: OBSTETRICS & GYNECOLOGY

## 2024-01-25 PROCEDURE — 0501F PRENATAL FLOW SHEET: CPT | Performed by: OBSTETRICS & GYNECOLOGY

## 2024-01-25 PROCEDURE — 80053 COMPREHEN METABOLIC PANEL: CPT

## 2024-01-25 PROCEDURE — 85027 COMPLETE CBC AUTOMATED: CPT

## 2024-01-25 PROCEDURE — 84156 ASSAY OF PROTEIN URINE: CPT

## 2024-01-25 PROCEDURE — 36415 COLL VENOUS BLD VENIPUNCTURE: CPT

## 2024-01-25 NOTE — PROGRESS NOTES
NST today.    FHR reactive, baseline 150s , no decels, at least 2 accelerations noted.  Audible fetal mvmts.  No ctxs.     She is worried abt scheduling induction at 37 weeks bec all her other kids were at least 38 weeks.  She had 1 severe range BP at 30 wks MFM recomm 37 week del.  SEE NOTE 23 which is unsure abt dx may be GHTN not chronic.    I reviewed all her high risk issues below which increase the risk of stillbirth  I explained that is why we do 2 x a week monitoring, daily BPs and Glucose checks at home.  Pt is very compliant.  She decided to wait for her MFM appt in Feb to make a decision so she can be scheduled for her induction.      Eri Lewis MD       Medical Problems       Problem List       High risk pregnancy, multigravida of advanced maternal age in third trimester    Overview Addendum 2024 10:00 AM by Lorena Boucher MD     AMA age 49  at LURDES  S/p rr NIPS  - Growth 33 weeks - 94%            Chronic hypertension affecting pregnancy    Overview Addendum 2024  2:31 PM by Eri Lewis MD     - Chronic HTN not on meds? Or Dx of GHTN unclear   <> Deliver at 37.0 per MFM   <> twice weekly monitoring - NST and BPP  <> weekly HELLP labs - ordered today   Home BPs 120s/60s all wnl          History of shoulder dystocia in prior pregnancy    Overview Addendum 2024  1:18 PM by Lorena Boucher MD     - Shoulder dystocia in last pregnancy @ 9# 0oz, proven to 9# 4 oz.   - Unclear time of shoulder dystocia - delivered with cork screw and position change per CNM delivery note  - Declines primary c/s          Grand multipara    Overview Addendum 2024  2:03 PM by Eri Lewis MD       11  on ASA; PPH in first pregnancy    Declines flu and COVID           Anemia affecting pregnancy in third trimester    Overview Addendum 2024 10:01 AM by Lorena Boucher MD     - on oral iron Hb = 11.2          Gestational diabetes mellitus (GDM) in second  trimester    Overview Addendum 1/25/2024  2:06 PM by Eri Lewis MD       1/22/2024   NPH inulin 14 At Bedtime             History of postpartum hemorrhage

## 2024-01-29 ENCOUNTER — PATIENT MESSAGE (OUTPATIENT)
Dept: MATERNAL FETAL MEDICINE | Facility: CLINIC | Age: 49
End: 2024-01-29
Payer: COMMERCIAL

## 2024-01-30 ENCOUNTER — HOSPITAL ENCOUNTER (OUTPATIENT)
Dept: RADIOLOGY | Facility: CLINIC | Age: 49
Discharge: HOME | End: 2024-01-30
Payer: COMMERCIAL

## 2024-01-30 DIAGNOSIS — O24.419 GESTATIONAL DIABETES MELLITUS (GDM) IN THIRD TRIMESTER, GESTATIONAL DIABETES METHOD OF CONTROL UNSPECIFIED (HHS-HCC): ICD-10-CM

## 2024-01-30 PROCEDURE — 76819 FETAL BIOPHYS PROFIL W/O NST: CPT

## 2024-01-30 PROCEDURE — 76819 FETAL BIOPHYS PROFIL W/O NST: CPT | Performed by: STUDENT IN AN ORGANIZED HEALTH CARE EDUCATION/TRAINING PROGRAM

## 2024-02-01 ENCOUNTER — ROUTINE PRENATAL (OUTPATIENT)
Dept: OBSTETRICS AND GYNECOLOGY | Facility: CLINIC | Age: 49
End: 2024-02-01
Payer: COMMERCIAL

## 2024-02-01 VITALS — BODY MASS INDEX: 30.33 KG/M2 | DIASTOLIC BLOOD PRESSURE: 80 MMHG | SYSTOLIC BLOOD PRESSURE: 136 MMHG | WEIGHT: 199.5 LBS

## 2024-02-01 DIAGNOSIS — O24.414 INSULIN CONTROLLED GESTATIONAL DIABETES MELLITUS (GDM) IN SECOND TRIMESTER (HHS-HCC): ICD-10-CM

## 2024-02-01 DIAGNOSIS — O10.919 CHRONIC HYPERTENSION AFFECTING PREGNANCY (HHS-HCC): ICD-10-CM

## 2024-02-01 DIAGNOSIS — Z3A.35 35 WEEKS GESTATION OF PREGNANCY (HHS-HCC): ICD-10-CM

## 2024-02-01 PROCEDURE — 0501F PRENATAL FLOW SHEET: CPT | Performed by: OBSTETRICS & GYNECOLOGY

## 2024-02-01 PROCEDURE — 59025 FETAL NON-STRESS TEST: CPT | Performed by: OBSTETRICS & GYNECOLOGY

## 2024-02-01 NOTE — PROGRESS NOTES
Doing well. No complaints.     NST     FHR reactive, baseline 150s , no decels, at least 2 accelerations noted.  Audible fetal mvmts.  No ctxs.     Has weekly US . Home BPs and sugars all wnl.   Pt waiting until after MFM US next week to decide induction date.      Medical Problems       Problem List       High risk pregnancy, multigravida of advanced maternal age in third trimester    Overview Addendum 2024 10:00 AM by Lorena Boucher MD     AMA >40 at LURDES  S/p rr NIPS  - Growth 33 weeks - 94%            Chronic hypertension affecting pregnancy    Overview Addendum 2024  2:31 PM by Eri Lewis MD     - Chronic HTN not on meds  ** first noted at 15 weeks OB visit on 9/15/23   <> Deliver at 37.0 per MFM note 23 when she had severe range BP   <> twice weekly monitoring - NST and BPP  <> weekly HELLP labs   Home BPs 120s/60s         History of shoulder dystocia in prior pregnancy    Overview Addendum 2024  1:18 PM by Lorena Boucher MD     - Shoulder dystocia in last pregnancy @ 9# 0oz, proven to 9# 4 oz.   - Unclear time of shoulder dystocia - delivered with cork screw and position change per CNM delivery note  - Declines primary c/s          Grand multipara    Overview Addendum 2024  2:03 PM by Eri Lewis MD       11  on ASA; PPH in first pregnancy    Declines flu and COVID           Anemia affecting pregnancy in third trimester    Overview Addendum 2024 10:01 AM by Lorena Boucher MD     - on oral iron          Gestational diabetes mellitus (GDM) in second trimester    Overview Addendum 2024  9:18 AM by Chandni Mahajan RN     On insulin 14 unit at pm   2024   NPH inulin 14 At Bedtime    2024 NPH 14 at bedtime             History of postpartum hemorrhage           Eri Lewis MD

## 2024-02-05 ENCOUNTER — PATIENT MESSAGE (OUTPATIENT)
Dept: OBSTETRICS AND GYNECOLOGY | Facility: CLINIC | Age: 49
End: 2024-02-05
Payer: COMMERCIAL

## 2024-02-06 ENCOUNTER — OFFICE VISIT (OUTPATIENT)
Dept: MATERNAL FETAL MEDICINE | Facility: CLINIC | Age: 49
End: 2024-02-06
Payer: COMMERCIAL

## 2024-02-06 ENCOUNTER — HOSPITAL ENCOUNTER (OUTPATIENT)
Dept: RADIOLOGY | Facility: CLINIC | Age: 49
Discharge: HOME | End: 2024-02-06
Payer: COMMERCIAL

## 2024-02-06 ENCOUNTER — APPOINTMENT (OUTPATIENT)
Dept: OBSTETRICS AND GYNECOLOGY | Facility: CLINIC | Age: 49
End: 2024-02-06
Payer: COMMERCIAL

## 2024-02-06 VITALS
WEIGHT: 199 LBS | SYSTOLIC BLOOD PRESSURE: 147 MMHG | HEART RATE: 88 BPM | BODY MASS INDEX: 30.26 KG/M2 | DIASTOLIC BLOOD PRESSURE: 79 MMHG

## 2024-02-06 DIAGNOSIS — Z87.59 HISTORY OF POSTPARTUM HEMORRHAGE: ICD-10-CM

## 2024-02-06 DIAGNOSIS — O10.919 CHRONIC HYPERTENSION AFFECTING PREGNANCY (HHS-HCC): ICD-10-CM

## 2024-02-06 DIAGNOSIS — O99.013 ANEMIA AFFECTING PREGNANCY IN THIRD TRIMESTER (HHS-HCC): ICD-10-CM

## 2024-02-06 DIAGNOSIS — O24.419 GESTATIONAL DIABETES MELLITUS (GDM) IN THIRD TRIMESTER, GESTATIONAL DIABETES METHOD OF CONTROL UNSPECIFIED (HHS-HCC): ICD-10-CM

## 2024-02-06 DIAGNOSIS — O09.523 HIGH RISK PREGNANCY, MULTIGRAVIDA OF ADVANCED MATERNAL AGE IN THIRD TRIMESTER (HHS-HCC): ICD-10-CM

## 2024-02-06 PROCEDURE — 1036F TOBACCO NON-USER: CPT | Performed by: STUDENT IN AN ORGANIZED HEALTH CARE EDUCATION/TRAINING PROGRAM

## 2024-02-06 PROCEDURE — 76816 OB US FOLLOW-UP PER FETUS: CPT

## 2024-02-06 PROCEDURE — 3062F POS MACROALBUMINURIA REV: CPT | Performed by: STUDENT IN AN ORGANIZED HEALTH CARE EDUCATION/TRAINING PROGRAM

## 2024-02-06 PROCEDURE — 76819 FETAL BIOPHYS PROFIL W/O NST: CPT | Performed by: MEDICAL GENETICS

## 2024-02-06 PROCEDURE — 3077F SYST BP >= 140 MM HG: CPT | Performed by: STUDENT IN AN ORGANIZED HEALTH CARE EDUCATION/TRAINING PROGRAM

## 2024-02-06 PROCEDURE — 76816 OB US FOLLOW-UP PER FETUS: CPT | Performed by: MEDICAL GENETICS

## 2024-02-06 PROCEDURE — 3078F DIAST BP <80 MM HG: CPT | Performed by: STUDENT IN AN ORGANIZED HEALTH CARE EDUCATION/TRAINING PROGRAM

## 2024-02-06 PROCEDURE — 76819 FETAL BIOPHYS PROFIL W/O NST: CPT

## 2024-02-06 PROCEDURE — 0501F PRENATAL FLOW SHEET: CPT | Performed by: STUDENT IN AN ORGANIZED HEALTH CARE EDUCATION/TRAINING PROGRAM

## 2024-02-06 ASSESSMENT — PAIN SCALES - GENERAL: PAINLEVEL: 0-NO PAIN

## 2024-02-06 NOTE — PROGRESS NOTES
MFM Follow-up  2024         SUBJECTIVE    HPI: Jackelin Umana is a 49 y.o.  at 36w2d here for RPNV. Denies contractions, bleeding, or LOF. Reports normal fetal movement. Patient reports no other concerns.  Nighttime insulin recently increased to 20 units, has been euglycemic since.     OBJECTIVE    Visit Vitals  OB Status Pregnant   Smoking Status Former      147/79 (reports all BP at home within normal limits)  FHT: ++ on ultrasound    ASSESSMENT & PLAN    Jackelin Umana is a 49 y.o.  at 36w2d here for the following concerns we addressed today:    GDM  -Overall well controlled, euglycemic since insulin adjustment  -Previously discussed risks of DM in pregnancy- would recommend delivery 37-38 weeks given need for recent up titration- patient desires IOL on , have communicated this with primary provider  -See below for SD counseling    History of SD  -Pt reports approximately 2 minute SD with last delivery- denies any sequelae, she reports it being due to fetal rotation due to OP presentation  -Discussed the risk of recurrence and uncertain predictability  -Offered primary , patient declines  -Shoulder precautions at time of delivery    cHTN  -BP normotensive at home, mild range in office today  -Asymptomatic  -Warning signs discussed  -IOL timing as above in DM problem    Seen & d/w Dr. Anthony Velez MD

## 2024-02-07 ENCOUNTER — TELEPHONE (OUTPATIENT)
Dept: OBSTETRICS AND GYNECOLOGY | Facility: CLINIC | Age: 49
End: 2024-02-07

## 2024-02-07 NOTE — TELEPHONE ENCOUNTER
Patient called regarding a script for a breast pump. Patient thought the script was already sent over, but the pharmacy has yet to receive it. The preferred pharmacy was confirmed with the patient. Please advise.

## 2024-02-08 ENCOUNTER — APPOINTMENT (OUTPATIENT)
Dept: OBSTETRICS AND GYNECOLOGY | Facility: CLINIC | Age: 49
End: 2024-02-08
Payer: COMMERCIAL

## 2024-02-08 PROBLEM — O36.63X0 EXCESSIVE FETAL GROWTH AFFECTING MANAGEMENT OF PREGNANCY IN THIRD TRIMESTER (HHS-HCC): Status: ACTIVE | Noted: 2024-02-08

## 2024-02-09 ENCOUNTER — PREP FOR PROCEDURE (OUTPATIENT)
Dept: OBSTETRICS AND GYNECOLOGY | Facility: HOSPITAL | Age: 49
End: 2024-02-09

## 2024-02-09 ENCOUNTER — APPOINTMENT (OUTPATIENT)
Dept: OBSTETRICS AND GYNECOLOGY | Facility: CLINIC | Age: 49
End: 2024-02-09
Payer: COMMERCIAL

## 2024-02-09 ENCOUNTER — ROUTINE PRENATAL (OUTPATIENT)
Dept: OBSTETRICS AND GYNECOLOGY | Facility: CLINIC | Age: 49
End: 2024-02-09
Payer: COMMERCIAL

## 2024-02-09 VITALS
WEIGHT: 199.13 LBS | DIASTOLIC BLOOD PRESSURE: 94 MMHG | BODY MASS INDEX: 30.28 KG/M2 | SYSTOLIC BLOOD PRESSURE: 132 MMHG

## 2024-02-09 DIAGNOSIS — O36.63X0 EXCESSIVE FETAL GROWTH AFFECTING MANAGEMENT OF PREGNANCY IN THIRD TRIMESTER, SINGLE OR UNSPECIFIED FETUS (HHS-HCC): ICD-10-CM

## 2024-02-09 DIAGNOSIS — O24.419 GESTATIONAL DIABETES MELLITUS, CLASS A2 (HHS-HCC): Primary | ICD-10-CM

## 2024-02-09 DIAGNOSIS — Z3A.36 36 WEEKS GESTATION OF PREGNANCY (HHS-HCC): ICD-10-CM

## 2024-02-09 DIAGNOSIS — O09.523 HIGH RISK PREGNANCY, MULTIGRAVIDA OF ADVANCED MATERNAL AGE IN THIRD TRIMESTER (HHS-HCC): Primary | ICD-10-CM

## 2024-02-09 DIAGNOSIS — O99.013 ANEMIA AFFECTING PREGNANCY IN THIRD TRIMESTER (HHS-HCC): ICD-10-CM

## 2024-02-09 DIAGNOSIS — O24.419 GESTATIONAL DIABETES MELLITUS, CLASS A2 (HHS-HCC): ICD-10-CM

## 2024-02-09 DIAGNOSIS — O10.919 CHRONIC HYPERTENSION AFFECTING PREGNANCY (HHS-HCC): ICD-10-CM

## 2024-02-09 DIAGNOSIS — Z87.59 HISTORY OF SHOULDER DYSTOCIA IN PRIOR PREGNANCY: ICD-10-CM

## 2024-02-09 DIAGNOSIS — O24.414 INSULIN CONTROLLED GESTATIONAL DIABETES MELLITUS (GDM) IN SECOND TRIMESTER (HHS-HCC): ICD-10-CM

## 2024-02-09 PROCEDURE — 87081 CULTURE SCREEN ONLY: CPT

## 2024-02-09 PROCEDURE — 0501F PRENATAL FLOW SHEET: CPT | Performed by: STUDENT IN AN ORGANIZED HEALTH CARE EDUCATION/TRAINING PROGRAM

## 2024-02-09 PROCEDURE — 59025 FETAL NON-STRESS TEST: CPT | Performed by: STUDENT IN AN ORGANIZED HEALTH CARE EDUCATION/TRAINING PROGRAM

## 2024-02-09 NOTE — PROGRESS NOTES
Subjective   Patient ID [id number]   Jackelin Umana is a 49 y.o.  at 36w5d with a working estimated date of delivery of 3/3/2024, by Ultrasound who presents for a routine prenatal visit. Good FM, no OB complaints. BPs at home normotensive. Sugars largely WNL with a few elevated fastings.     Objective   Physical Exam  Vitals:    24 0900   BP: (!) 132/94      Weight: 90.3 kg (199 lb 2 oz), Pregravid BMI: 30.11  Expected Total Weight Gain: 5 kg (11 lb)-9 kg (19 lb)   Fundal height and FHR per prenatal flowsheet    Assessment/Plan     Jackelin Umana is a 49 y.o.  at 36w5d with a working estimated date of delivery of 3/3/2024, by Ultrasound who presents for a routine prenatal visit.    GBS today, SVE 3. Continue twice weekly testing for GDMA2. NST today personally reviewed by me, reactive. Has BPP and NST scheduled for next week. Reviewed MFM recommendation for delivery in 37th week. Previously counseled extensively about prior shoulder dystocia (9lb, no GDM) and risk of recurrence. Counseled again today. Declines pCS. IOL scheduled today for  at 8am per patient request. Discussed CRB/pit. Home BPs reviewed and normotensive, low mild range in office today without si/sx of PEC. PEC precautions provided. Remainder of plan per problem list as below.     Problem List Items Addressed This Visit       Anemia affecting pregnancy in third trimester    Overview     -Resolved on oral iron          Chronic hypertension affecting pregnancy    Overview     -First noted at 15 weeks OB visit on 9/15/23   -Home BPs 120s/60s  -Baseline P:C 0.10  -Delivery at 37-38wga for GDM per MFM note /         Excessive fetal growth affecting management of pregnancy in third trimester    Overview     -Last growth (): EFW 3611g (97%), AC > 99%, HC 14%          Gestational diabetes mellitus (GDM) in second trimester    Overview     Last growth (): EFW 3611g (97%), AC > 99%, HC 14%    2024   NPH inulin 14 At  Bedtime    1/29/2024 NPH 14 at bedtime  2/5/2024 NPH 14 --> 20* at bedtime @ 36w1d     For delivery in 37th week per [facility]         High risk pregnancy, multigravida of advanced maternal age in third trimester - Primary    Overview     -Declines flu and COVID vaccinations  -s/p risk-reducing cfDNA  -IOL scheduled for 37.5wga on Friday 2/16 at 8am per patient request. Dr. Quintero aware         Relevant Orders    Group B Streptococcus (GBS) Prenatal Screen, Culture    History of shoulder dystocia in prior pregnancy    Overview     - Shoulder dystocia in last pregnancy @ 9# 0oz, proven to 9# 4 oz.   - Unclear time of shoulder dystocia - delivered with cork screw and position change per [facility] delivery note  - LGA growth pattern this pregnancy in setting of GDMA2, extrapolates to 3900g (8lb 10 oz) at 37wga  - Declines primary c/s           Other Visit Diagnoses       Gestational diabetes mellitus, class A2        Relevant Orders    Fetal nonstress test (Completed)    36 weeks gestation of pregnancy        Care and examination of lactating mother        Relevant Orders    General supply request Double electric breast pump          Follow up in 4 days for BPP     Valarie Paniagua MD

## 2024-02-09 NOTE — PROCEDURES
Jackelin mUana, margaret  at 36w5d with an LURDES of 3/3/2024, by Ultrasound, was seen at [facility] for a nonstress test.    Non-Stress Test   Baseline Fetal Heart Rate for Non-Stress Test: 145 BPM  Variability in Waveform for Non-Stress Test: Moderate  Accelerations in Non-Stress Test: Yes  Decelerations in Non-Stress Test: None  Contractions in Non-Stress Test: Not present  Acoustic Stimulator for Non-Stress Test: No  Interpretation of Non-Stress Test   Interpretation of Non-Stress Test: Reactive      Valarie Paniagua MD

## 2024-02-12 ENCOUNTER — TELEPHONE (OUTPATIENT)
Dept: MATERNAL FETAL MEDICINE | Facility: CLINIC | Age: 49
End: 2024-02-12
Payer: COMMERCIAL

## 2024-02-12 LAB — GP B STREP GENITAL QL CULT: NORMAL

## 2024-02-12 NOTE — TELEPHONE ENCOUNTER
Communicated with the patient on 2/12/2024  She has Gestational Diabetes @ 37w1d     The patient checks her sugars fasting and 1 hour after meals, and reports them as follows:        The patient's regimen was changed, as discussed with JOSE MIGUEL Perkins,to:   NPH 20 --> 26* at bedtime    Scheduled for IOL on 2/16.  Reviewed with the patient that postpartum evaluation of diabetes is strongly recommended with a two-hour 75 g oral glucose tolerance test following delivery. This test is performed in a fasted state, and will be ordered by her primary OB provider.    Healthy lifestyle recommendations such as regular physical activity, good nutrition and maintaining a healthy weight were reviewed. It is further recommended to receive early screening of diabetes in subsequent pregnancies as well as ongoing evaluation for diabetes at least every three years outside of pregnancy.    Patient understands to submit sugar log for review weekly through the Blood Sugar Line @ [phone] or via email to [email or web address] to help optimize glucose control.    Approximately 4 minutes was spent discussing the above information.

## 2024-02-13 ENCOUNTER — HOSPITAL ENCOUNTER (OUTPATIENT)
Dept: RADIOLOGY | Facility: CLINIC | Age: 49
Discharge: HOME | End: 2024-02-13
Payer: COMMERCIAL

## 2024-02-13 DIAGNOSIS — O24.419 GDM (GESTATIONAL DIABETES MELLITUS) (HHS-HCC): ICD-10-CM

## 2024-02-13 DIAGNOSIS — O10.919 CHRONIC HYPERTENSION AFFECTING PREGNANCY (HHS-HCC): ICD-10-CM

## 2024-02-13 PROCEDURE — 76819 FETAL BIOPHYS PROFIL W/O NST: CPT

## 2024-02-13 PROCEDURE — 76819 FETAL BIOPHYS PROFIL W/O NST: CPT | Performed by: MEDICAL GENETICS

## 2024-02-15 ENCOUNTER — ROUTINE PRENATAL (OUTPATIENT)
Dept: OBSTETRICS AND GYNECOLOGY | Facility: CLINIC | Age: 49
End: 2024-02-15
Payer: COMMERCIAL

## 2024-02-15 VITALS
BODY MASS INDEX: 30.47 KG/M2 | SYSTOLIC BLOOD PRESSURE: 128 MMHG | DIASTOLIC BLOOD PRESSURE: 78 MMHG | WEIGHT: 200.38 LBS

## 2024-02-15 DIAGNOSIS — O09.523 HIGH RISK PREGNANCY, MULTIGRAVIDA OF ADVANCED MATERNAL AGE IN THIRD TRIMESTER (HHS-HCC): Primary | ICD-10-CM

## 2024-02-15 DIAGNOSIS — O10.919 CHRONIC HYPERTENSION AFFECTING PREGNANCY (HHS-HCC): ICD-10-CM

## 2024-02-15 DIAGNOSIS — O24.414 INSULIN CONTROLLED GESTATIONAL DIABETES MELLITUS (GDM) IN SECOND TRIMESTER (HHS-HCC): ICD-10-CM

## 2024-02-15 PROCEDURE — 0501F PRENATAL FLOW SHEET: CPT | Performed by: OBSTETRICS & GYNECOLOGY

## 2024-02-15 NOTE — PROGRESS NOTES
NST today, induction tomorrow.    NST   FHR reactive, baseline 150s , no decels, at least 2 accelerations noted.  Audible fetal mvmts.  No ctxs.     BPs wnl    EFW 8.5 lbs , Good fetal mvmts. Planned labor induction tomorrow for GDMA2 , insulin increased dose last week. Declines Primary csec for one baby w SD , others none at same EFW.     VE : 2/50/-1 Membranes intact    Eri Lewis MD .

## 2024-02-16 ENCOUNTER — HOSPITAL ENCOUNTER (INPATIENT)
Facility: HOSPITAL | Age: 49
LOS: 2 days | Discharge: HOME | End: 2024-02-18
Attending: OBSTETRICS & GYNECOLOGY | Admitting: OBSTETRICS & GYNECOLOGY
Payer: COMMERCIAL

## 2024-02-16 ENCOUNTER — APPOINTMENT (OUTPATIENT)
Dept: OBSTETRICS AND GYNECOLOGY | Facility: CLINIC | Age: 49
End: 2024-02-16
Payer: COMMERCIAL

## 2024-02-16 ENCOUNTER — ANESTHESIA (OUTPATIENT)
Dept: OBSTETRICS AND GYNECOLOGY | Facility: HOSPITAL | Age: 49
End: 2024-02-16
Payer: COMMERCIAL

## 2024-02-16 ENCOUNTER — APPOINTMENT (OUTPATIENT)
Dept: OBSTETRICS AND GYNECOLOGY | Facility: HOSPITAL | Age: 49
End: 2024-02-16
Payer: COMMERCIAL

## 2024-02-16 ENCOUNTER — ANESTHESIA EVENT (OUTPATIENT)
Dept: OBSTETRICS AND GYNECOLOGY | Facility: HOSPITAL | Age: 49
End: 2024-02-16
Payer: COMMERCIAL

## 2024-02-16 DIAGNOSIS — O24.419 GESTATIONAL DIABETES MELLITUS, CLASS A2 (HHS-HCC): ICD-10-CM

## 2024-02-16 DIAGNOSIS — O10.919 CHRONIC HYPERTENSION AFFECTING PREGNANCY (HHS-HCC): Primary | ICD-10-CM

## 2024-02-16 DIAGNOSIS — R10.9 ABDOMINAL CRAMPING: ICD-10-CM

## 2024-02-16 PROBLEM — Z34.90 TERM PREGNANCY (HHS-HCC): Status: ACTIVE | Noted: 2024-02-16

## 2024-02-16 LAB
ABO GROUP (TYPE) IN BLOOD: NORMAL
ALBUMIN SERPL BCP-MCNC: 3.5 G/DL (ref 3.4–5)
ALP SERPL-CCNC: 188 U/L (ref 33–110)
ALT SERPL W P-5'-P-CCNC: 10 U/L (ref 7–45)
ANION GAP SERPL CALC-SCNC: 14 MMOL/L (ref 10–20)
ANTIBODY SCREEN: NORMAL
AST SERPL W P-5'-P-CCNC: 12 U/L (ref 9–39)
BILIRUB SERPL-MCNC: 0.5 MG/DL (ref 0–1.2)
BUN SERPL-MCNC: 12 MG/DL (ref 6–23)
CALCIUM SERPL-MCNC: 9.4 MG/DL (ref 8.6–10.3)
CHLORIDE SERPL-SCNC: 106 MMOL/L (ref 98–107)
CO2 SERPL-SCNC: 21 MMOL/L (ref 21–32)
CREAT SERPL-MCNC: 0.57 MG/DL (ref 0.5–1.05)
EGFRCR SERPLBLD CKD-EPI 2021: >90 ML/MIN/1.73M*2
ERYTHROCYTE [DISTWIDTH] IN BLOOD BY AUTOMATED COUNT: 15.2 % (ref 11.5–14.5)
GLUCOSE BLD MANUAL STRIP-MCNC: 75 MG/DL (ref 74–99)
GLUCOSE BLD MANUAL STRIP-MCNC: 87 MG/DL (ref 74–99)
GLUCOSE BLD MANUAL STRIP-MCNC: 97 MG/DL (ref 74–99)
GLUCOSE SERPL-MCNC: 90 MG/DL (ref 74–99)
HCT VFR BLD AUTO: 35.7 % (ref 36–46)
HGB BLD-MCNC: 12 G/DL (ref 12–16)
HOLD SPECIMEN: NORMAL
HOLD SPECIMEN: NORMAL
MCH RBC QN AUTO: 29.6 PG (ref 26–34)
MCHC RBC AUTO-ENTMCNC: 33.6 G/DL (ref 32–36)
MCV RBC AUTO: 88 FL (ref 80–100)
NRBC BLD-RTO: 0 /100 WBCS (ref 0–0)
PLATELET # BLD AUTO: 165 X10*3/UL (ref 150–450)
POTASSIUM SERPL-SCNC: 3.9 MMOL/L (ref 3.5–5.3)
PROT SERPL-MCNC: 6.6 G/DL (ref 6.4–8.2)
RBC # BLD AUTO: 4.06 X10*6/UL (ref 4–5.2)
RH FACTOR (ANTIGEN D): NORMAL
SODIUM SERPL-SCNC: 137 MMOL/L (ref 136–145)
TREPONEMA PALLIDUM IGG+IGM AB [PRESENCE] IN SERUM OR PLASMA BY IMMUNOASSAY: NONREACTIVE
WBC # BLD AUTO: 7.7 X10*3/UL (ref 4.4–11.3)

## 2024-02-16 PROCEDURE — 3E033VJ INTRODUCTION OF OTHER HORMONE INTO PERIPHERAL VEIN, PERCUTANEOUS APPROACH: ICD-10-PCS | Performed by: OBSTETRICS & GYNECOLOGY

## 2024-02-16 PROCEDURE — 1120000001 HC OB PRIVATE ROOM DAILY

## 2024-02-16 PROCEDURE — 2500000004 HC RX 250 GENERAL PHARMACY W/ HCPCS (ALT 636 FOR OP/ED): Performed by: NURSE ANESTHETIST, CERTIFIED REGISTERED

## 2024-02-16 PROCEDURE — 85027 COMPLETE CBC AUTOMATED: CPT | Performed by: OBSTETRICS & GYNECOLOGY

## 2024-02-16 PROCEDURE — 36415 COLL VENOUS BLD VENIPUNCTURE: CPT | Performed by: OBSTETRICS & GYNECOLOGY

## 2024-02-16 PROCEDURE — 51702 INSERT TEMP BLADDER CATH: CPT

## 2024-02-16 PROCEDURE — 88307 TISSUE EXAM BY PATHOLOGIST: CPT | Performed by: STUDENT IN AN ORGANIZED HEALTH CARE EDUCATION/TRAINING PROGRAM

## 2024-02-16 PROCEDURE — 59400 OBSTETRICAL CARE: CPT | Performed by: OBSTETRICS & GYNECOLOGY

## 2024-02-16 PROCEDURE — 2500000004 HC RX 250 GENERAL PHARMACY W/ HCPCS (ALT 636 FOR OP/ED): Performed by: OBSTETRICS & GYNECOLOGY

## 2024-02-16 PROCEDURE — 86901 BLOOD TYPING SEROLOGIC RH(D): CPT | Performed by: OBSTETRICS & GYNECOLOGY

## 2024-02-16 PROCEDURE — 82947 ASSAY GLUCOSE BLOOD QUANT: CPT

## 2024-02-16 PROCEDURE — 7100000016 HC LABOR RECOVERY PER HOUR

## 2024-02-16 PROCEDURE — 59050 FETAL MONITOR W/REPORT: CPT

## 2024-02-16 PROCEDURE — 01967 NEURAXL LBR ANES VAG DLVR: CPT | Performed by: NURSE ANESTHETIST, CERTIFIED REGISTERED

## 2024-02-16 PROCEDURE — 10H07YZ INSERTION OF OTHER DEVICE INTO PRODUCTS OF CONCEPTION, VIA NATURAL OR ARTIFICIAL OPENING: ICD-10-PCS | Performed by: OBSTETRICS & GYNECOLOGY

## 2024-02-16 PROCEDURE — 86780 TREPONEMA PALLIDUM: CPT | Mod: STJLAB | Performed by: OBSTETRICS & GYNECOLOGY

## 2024-02-16 PROCEDURE — 80053 COMPREHEN METABOLIC PANEL: CPT | Performed by: OBSTETRICS & GYNECOLOGY

## 2024-02-16 PROCEDURE — 10907ZC DRAINAGE OF AMNIOTIC FLUID, THERAPEUTIC FROM PRODUCTS OF CONCEPTION, VIA NATURAL OR ARTIFICIAL OPENING: ICD-10-PCS | Performed by: OBSTETRICS & GYNECOLOGY

## 2024-02-16 PROCEDURE — 88307 TISSUE EXAM BY PATHOLOGIST: CPT | Mod: TC,SUR,STJLAB | Performed by: OBSTETRICS & GYNECOLOGY

## 2024-02-16 PROCEDURE — 86920 COMPATIBILITY TEST SPIN: CPT

## 2024-02-16 PROCEDURE — 59409 OBSTETRICAL CARE: CPT | Performed by: OBSTETRICS & GYNECOLOGY

## 2024-02-16 PROCEDURE — 7210000002 HC LABOR PER HOUR

## 2024-02-16 RX ORDER — FERROUS SULFATE 325(65) MG
100 TABLET ORAL
Status: ON HOLD | COMMUNITY
End: 2024-02-17 | Stop reason: WASHOUT

## 2024-02-16 RX ORDER — DIPHENHYDRAMINE HYDROCHLORIDE 50 MG/ML
25 INJECTION INTRAMUSCULAR; INTRAVENOUS EVERY 6 HOURS PRN
Status: DISCONTINUED | OUTPATIENT
Start: 2024-02-16 | End: 2024-02-18 | Stop reason: HOSPADM

## 2024-02-16 RX ORDER — POLYETHYLENE GLYCOL 3350 17 G/17G
17 POWDER, FOR SOLUTION ORAL 2 TIMES DAILY PRN
Status: DISCONTINUED | OUTPATIENT
Start: 2024-02-16 | End: 2024-02-18 | Stop reason: HOSPADM

## 2024-02-16 RX ORDER — LOPERAMIDE HYDROCHLORIDE 2 MG/1
4 CAPSULE ORAL EVERY 2 HOUR PRN
Status: DISCONTINUED | OUTPATIENT
Start: 2024-02-16 | End: 2024-02-16 | Stop reason: SDUPTHER

## 2024-02-16 RX ORDER — METOCLOPRAMIDE HYDROCHLORIDE 5 MG/ML
10 INJECTION INTRAMUSCULAR; INTRAVENOUS EVERY 6 HOURS PRN
Status: DISCONTINUED | OUTPATIENT
Start: 2024-02-16 | End: 2024-02-16

## 2024-02-16 RX ORDER — LIDOCAINE HYDROCHLORIDE 10 MG/ML
30 INJECTION INFILTRATION; PERINEURAL ONCE AS NEEDED
Status: DISCONTINUED | OUTPATIENT
Start: 2024-02-16 | End: 2024-02-16

## 2024-02-16 RX ORDER — OXYTOCIN 10 [USP'U]/ML
10 INJECTION, SOLUTION INTRAMUSCULAR; INTRAVENOUS ONCE AS NEEDED
Status: DISCONTINUED | OUTPATIENT
Start: 2024-02-16 | End: 2024-02-18 | Stop reason: HOSPADM

## 2024-02-16 RX ORDER — METHYLERGONOVINE MALEATE 0.2 MG/ML
0.2 INJECTION INTRAVENOUS ONCE AS NEEDED
Status: DISCONTINUED | OUTPATIENT
Start: 2024-02-16 | End: 2024-02-16 | Stop reason: SDUPTHER

## 2024-02-16 RX ORDER — MINERAL OIL
OIL (ML) ORAL
Status: DISCONTINUED
Start: 2024-02-16 | End: 2024-02-16 | Stop reason: WASHOUT

## 2024-02-16 RX ORDER — MAGNESIUM HYDROXIDE 2400 MG/10ML
10 SUSPENSION ORAL
Status: DISCONTINUED | OUTPATIENT
Start: 2024-02-16 | End: 2024-02-18 | Stop reason: HOSPADM

## 2024-02-16 RX ORDER — LIDOCAINE 560 MG/1
1 PATCH PERCUTANEOUS; TOPICAL; TRANSDERMAL
Status: DISCONTINUED | OUTPATIENT
Start: 2024-02-16 | End: 2024-02-18 | Stop reason: HOSPADM

## 2024-02-16 RX ORDER — DEXTROSE 40 %
15 GEL (GRAM) ORAL
Status: DISCONTINUED | OUTPATIENT
Start: 2024-02-16 | End: 2024-02-16

## 2024-02-16 RX ORDER — NIFEDIPINE 10 MG/1
10 CAPSULE ORAL ONCE AS NEEDED
Status: DISCONTINUED | OUTPATIENT
Start: 2024-02-16 | End: 2024-02-18 | Stop reason: HOSPADM

## 2024-02-16 RX ORDER — LABETALOL HYDROCHLORIDE 5 MG/ML
20 INJECTION, SOLUTION INTRAVENOUS ONCE AS NEEDED
Status: DISCONTINUED | OUTPATIENT
Start: 2024-02-16 | End: 2024-02-18 | Stop reason: HOSPADM

## 2024-02-16 RX ORDER — MISOPROSTOL 200 UG/1
800 TABLET ORAL ONCE AS NEEDED
Status: DISCONTINUED | OUTPATIENT
Start: 2024-02-16 | End: 2024-02-18 | Stop reason: HOSPADM

## 2024-02-16 RX ORDER — ONDANSETRON 4 MG/1
4 TABLET, FILM COATED ORAL EVERY 6 HOURS PRN
Status: DISCONTINUED | OUTPATIENT
Start: 2024-02-16 | End: 2024-02-16 | Stop reason: SDUPTHER

## 2024-02-16 RX ORDER — MISOPROSTOL 200 UG/1
800 TABLET ORAL ONCE AS NEEDED
Status: DISCONTINUED | OUTPATIENT
Start: 2024-02-16 | End: 2024-02-16 | Stop reason: SDUPTHER

## 2024-02-16 RX ORDER — TRANEXAMIC ACID 100 MG/ML
1000 INJECTION, SOLUTION INTRAVENOUS ONCE AS NEEDED
Status: DISCONTINUED | OUTPATIENT
Start: 2024-02-16 | End: 2024-02-16 | Stop reason: SDUPTHER

## 2024-02-16 RX ORDER — ACETAMINOPHEN 325 MG/1
975 TABLET ORAL EVERY 6 HOURS
Status: DISCONTINUED | OUTPATIENT
Start: 2024-02-16 | End: 2024-02-18 | Stop reason: HOSPADM

## 2024-02-16 RX ORDER — METHYLERGONOVINE MALEATE 0.2 MG/ML
0.2 INJECTION INTRAVENOUS ONCE AS NEEDED
Status: DISCONTINUED | OUTPATIENT
Start: 2024-02-16 | End: 2024-02-18 | Stop reason: HOSPADM

## 2024-02-16 RX ORDER — OXYTOCIN/0.9 % SODIUM CHLORIDE 30/500 ML
2-30 PLASTIC BAG, INJECTION (ML) INTRAVENOUS CONTINUOUS
Status: DISCONTINUED | OUTPATIENT
Start: 2024-02-16 | End: 2024-02-16

## 2024-02-16 RX ORDER — OXYTOCIN/0.9 % SODIUM CHLORIDE 30/500 ML
60 PLASTIC BAG, INJECTION (ML) INTRAVENOUS ONCE AS NEEDED
Status: DISCONTINUED | OUTPATIENT
Start: 2024-02-16 | End: 2024-02-18 | Stop reason: HOSPADM

## 2024-02-16 RX ORDER — DIPHENHYDRAMINE HCL 25 MG
25 TABLET ORAL EVERY 6 HOURS PRN
Status: DISCONTINUED | OUTPATIENT
Start: 2024-02-16 | End: 2024-02-18 | Stop reason: HOSPADM

## 2024-02-16 RX ORDER — OXYTOCIN/0.9 % SODIUM CHLORIDE 30/500 ML
60 PLASTIC BAG, INJECTION (ML) INTRAVENOUS ONCE AS NEEDED
Status: DISCONTINUED | OUTPATIENT
Start: 2024-02-16 | End: 2024-02-16 | Stop reason: SDUPTHER

## 2024-02-16 RX ORDER — HYDRALAZINE HYDROCHLORIDE 20 MG/ML
5 INJECTION INTRAMUSCULAR; INTRAVENOUS ONCE AS NEEDED
Status: DISCONTINUED | OUTPATIENT
Start: 2024-02-16 | End: 2024-02-16 | Stop reason: SDUPTHER

## 2024-02-16 RX ORDER — NIFEDIPINE 10 MG/1
10 CAPSULE ORAL ONCE AS NEEDED
Status: DISCONTINUED | OUTPATIENT
Start: 2024-02-16 | End: 2024-02-16 | Stop reason: SDUPTHER

## 2024-02-16 RX ORDER — TERBUTALINE SULFATE 1 MG/ML
0.25 INJECTION SUBCUTANEOUS ONCE AS NEEDED
Status: DISCONTINUED | OUTPATIENT
Start: 2024-02-16 | End: 2024-02-16

## 2024-02-16 RX ORDER — CARBOPROST TROMETHAMINE 250 UG/ML
250 INJECTION, SOLUTION INTRAMUSCULAR ONCE AS NEEDED
Status: DISCONTINUED | OUTPATIENT
Start: 2024-02-16 | End: 2024-02-16 | Stop reason: SDUPTHER

## 2024-02-16 RX ORDER — CARBOPROST TROMETHAMINE 250 UG/ML
250 INJECTION, SOLUTION INTRAMUSCULAR ONCE AS NEEDED
Status: DISCONTINUED | OUTPATIENT
Start: 2024-02-16 | End: 2024-02-18 | Stop reason: HOSPADM

## 2024-02-16 RX ORDER — DEXTROSE 50 % IN WATER (D50W) INTRAVENOUS SYRINGE
25
Status: DISCONTINUED | OUTPATIENT
Start: 2024-02-16 | End: 2024-02-16

## 2024-02-16 RX ORDER — TRANEXAMIC ACID 100 MG/ML
1000 INJECTION, SOLUTION INTRAVENOUS ONCE AS NEEDED
Status: DISCONTINUED | OUTPATIENT
Start: 2024-02-16 | End: 2024-02-18 | Stop reason: HOSPADM

## 2024-02-16 RX ORDER — INSULIN LISPRO 100 [IU]/ML
0-10 INJECTION, SOLUTION INTRAVENOUS; SUBCUTANEOUS
Status: DISCONTINUED | OUTPATIENT
Start: 2024-02-16 | End: 2024-02-16

## 2024-02-16 RX ORDER — LABETALOL HYDROCHLORIDE 5 MG/ML
20 INJECTION, SOLUTION INTRAVENOUS ONCE AS NEEDED
Status: DISCONTINUED | OUTPATIENT
Start: 2024-02-16 | End: 2024-02-16 | Stop reason: SDUPTHER

## 2024-02-16 RX ORDER — ONDANSETRON HYDROCHLORIDE 2 MG/ML
4 INJECTION, SOLUTION INTRAVENOUS EVERY 6 HOURS PRN
Status: DISCONTINUED | OUTPATIENT
Start: 2024-02-16 | End: 2024-02-16 | Stop reason: SDUPTHER

## 2024-02-16 RX ORDER — LOPERAMIDE HYDROCHLORIDE 2 MG/1
4 CAPSULE ORAL EVERY 2 HOUR PRN
Status: DISCONTINUED | OUTPATIENT
Start: 2024-02-16 | End: 2024-02-18 | Stop reason: HOSPADM

## 2024-02-16 RX ORDER — DEXTROSE 40 %
30 GEL (GRAM) ORAL
Status: DISCONTINUED | OUTPATIENT
Start: 2024-02-16 | End: 2024-02-16

## 2024-02-16 RX ORDER — HYDRALAZINE HYDROCHLORIDE 20 MG/ML
5 INJECTION INTRAMUSCULAR; INTRAVENOUS ONCE AS NEEDED
Status: DISCONTINUED | OUTPATIENT
Start: 2024-02-16 | End: 2024-02-18 | Stop reason: HOSPADM

## 2024-02-16 RX ORDER — ONDANSETRON HYDROCHLORIDE 2 MG/ML
4 INJECTION, SOLUTION INTRAVENOUS EVERY 6 HOURS PRN
Status: DISCONTINUED | OUTPATIENT
Start: 2024-02-16 | End: 2024-02-18 | Stop reason: HOSPADM

## 2024-02-16 RX ORDER — BISACODYL 10 MG/1
10 SUPPOSITORY RECTAL DAILY PRN
Status: DISCONTINUED | OUTPATIENT
Start: 2024-02-16 | End: 2024-02-18 | Stop reason: HOSPADM

## 2024-02-16 RX ORDER — SODIUM CHLORIDE, SODIUM LACTATE, POTASSIUM CHLORIDE, CALCIUM CHLORIDE 600; 310; 30; 20 MG/100ML; MG/100ML; MG/100ML; MG/100ML
125 INJECTION, SOLUTION INTRAVENOUS CONTINUOUS
Status: DISCONTINUED | OUTPATIENT
Start: 2024-02-16 | End: 2024-02-16

## 2024-02-16 RX ORDER — IBUPROFEN 600 MG/1
600 TABLET ORAL EVERY 6 HOURS
Status: DISCONTINUED | OUTPATIENT
Start: 2024-02-16 | End: 2024-02-18 | Stop reason: HOSPADM

## 2024-02-16 RX ORDER — SIMETHICONE 80 MG
80 TABLET,CHEWABLE ORAL 4 TIMES DAILY PRN
Status: DISCONTINUED | OUTPATIENT
Start: 2024-02-16 | End: 2024-02-18 | Stop reason: HOSPADM

## 2024-02-16 RX ORDER — METOCLOPRAMIDE 10 MG/1
10 TABLET ORAL EVERY 6 HOURS PRN
Status: DISCONTINUED | OUTPATIENT
Start: 2024-02-16 | End: 2024-02-16

## 2024-02-16 RX ORDER — OXYTOCIN 10 [USP'U]/ML
10 INJECTION, SOLUTION INTRAMUSCULAR; INTRAVENOUS ONCE AS NEEDED
Status: DISCONTINUED | OUTPATIENT
Start: 2024-02-16 | End: 2024-02-16 | Stop reason: SDUPTHER

## 2024-02-16 RX ORDER — FENTANYL/ROPIVACAINE/NS/PF 2MCG/ML-.2
0-25 PLASTIC BAG, INJECTION (ML) INJECTION CONTINUOUS
Status: DISCONTINUED | OUTPATIENT
Start: 2024-02-16 | End: 2024-02-16

## 2024-02-16 RX ORDER — ONDANSETRON 4 MG/1
4 TABLET, FILM COATED ORAL EVERY 6 HOURS PRN
Status: DISCONTINUED | OUTPATIENT
Start: 2024-02-16 | End: 2024-02-18 | Stop reason: HOSPADM

## 2024-02-16 RX ADMIN — Medication 10 ML/HR: at 11:38

## 2024-02-16 RX ADMIN — Medication 8 ML: at 11:35

## 2024-02-16 RX ADMIN — SODIUM CHLORIDE, POTASSIUM CHLORIDE, SODIUM LACTATE AND CALCIUM CHLORIDE 125 ML/HR: 600; 310; 30; 20 INJECTION, SOLUTION INTRAVENOUS at 14:17

## 2024-02-16 RX ADMIN — SODIUM CHLORIDE, POTASSIUM CHLORIDE, SODIUM LACTATE AND CALCIUM CHLORIDE 125 ML/HR: 600; 310; 30; 20 INJECTION, SOLUTION INTRAVENOUS at 09:24

## 2024-02-16 RX ADMIN — Medication 2 MILLI-UNITS/MIN: at 09:25

## 2024-02-16 SDOH — HEALTH STABILITY: MENTAL HEALTH: NON-SPECIFIC ACTIVE SUICIDAL THOUGHTS (PAST 1 MONTH): NO

## 2024-02-16 SDOH — SOCIAL STABILITY: SOCIAL INSECURITY: HAVE YOU HAD THOUGHTS OF HARMING ANYONE ELSE?: NO

## 2024-02-16 SDOH — HEALTH STABILITY: MENTAL HEALTH: CURRENT SMOKER: 0

## 2024-02-16 SDOH — SOCIAL STABILITY: SOCIAL INSECURITY: DOES ANYONE TRY TO KEEP YOU FROM HAVING/CONTACTING OTHER FRIENDS OR DOING THINGS OUTSIDE YOUR HOME?: NO

## 2024-02-16 SDOH — ECONOMIC STABILITY: HOUSING INSECURITY: DO YOU FEEL UNSAFE GOING BACK TO THE PLACE WHERE YOU ARE LIVING?: NO

## 2024-02-16 SDOH — HEALTH STABILITY: MENTAL HEALTH: HAVE YOU USED ANY PRESCRIPTION DRUGS OTHER THAN PRESCRIBED IN THE PAST 12 MONTHS?: NO

## 2024-02-16 SDOH — SOCIAL STABILITY: SOCIAL INSECURITY: VERBAL ABUSE: DENIES

## 2024-02-16 SDOH — HEALTH STABILITY: MENTAL HEALTH: HAVE YOU USED ANY SUBSTANCES (CANABIS, COCAINE, HEROIN, HALLUCINOGENS, INHALANTS, ETC.) IN THE PAST 12 MONTHS?: NO

## 2024-02-16 SDOH — HEALTH STABILITY: MENTAL HEALTH: WERE YOU ABLE TO COMPLETE ALL THE BEHAVIORAL HEALTH SCREENINGS?: YES

## 2024-02-16 SDOH — HEALTH STABILITY: MENTAL HEALTH: SUICIDAL BEHAVIOR (LIFETIME): NO

## 2024-02-16 SDOH — SOCIAL STABILITY: SOCIAL INSECURITY: DO YOU FEEL ANYONE HAS EXPLOITED OR TAKEN ADVANTAGE OF YOU FINANCIALLY OR OF YOUR PERSONAL PROPERTY?: NO

## 2024-02-16 SDOH — SOCIAL STABILITY: SOCIAL INSECURITY: HAS ANYONE EVER THREATENED TO HURT YOUR FAMILY OR YOUR PETS?: NO

## 2024-02-16 SDOH — SOCIAL STABILITY: SOCIAL INSECURITY: ARE THERE ANY APPARENT SIGNS OF INJURIES/BEHAVIORS THAT COULD BE RELATED TO ABUSE/NEGLECT?: NO

## 2024-02-16 SDOH — HEALTH STABILITY: MENTAL HEALTH: WISH TO BE DEAD (PAST 1 MONTH): NO

## 2024-02-16 SDOH — SOCIAL STABILITY: SOCIAL INSECURITY: ABUSE SCREEN: ADULT

## 2024-02-16 SDOH — SOCIAL STABILITY: SOCIAL INSECURITY: PHYSICAL ABUSE: DENIES

## 2024-02-16 SDOH — SOCIAL STABILITY: SOCIAL INSECURITY: ARE YOU OR HAVE YOU BEEN THREATENED OR ABUSED PHYSICALLY, EMOTIONALLY, OR SEXUALLY BY ANYONE?: NO

## 2024-02-16 ASSESSMENT — PATIENT HEALTH QUESTIONNAIRE - PHQ9
1. LITTLE INTEREST OR PLEASURE IN DOING THINGS: NOT AT ALL
2. FEELING DOWN, DEPRESSED OR HOPELESS: NOT AT ALL
SUM OF ALL RESPONSES TO PHQ9 QUESTIONS 1 & 2: 0

## 2024-02-16 ASSESSMENT — PAIN SCALES - GENERAL
PAINLEVEL_OUTOF10: 0 - NO PAIN
PAIN_LEVEL: 0
PAINLEVEL_OUTOF10: 0 - NO PAIN

## 2024-02-16 ASSESSMENT — LIFESTYLE VARIABLES
HOW MANY STANDARD DRINKS CONTAINING ALCOHOL DO YOU HAVE ON A TYPICAL DAY: PATIENT DOES NOT DRINK
HOW OFTEN DO YOU HAVE A DRINK CONTAINING ALCOHOL: NEVER
HOW OFTEN DO YOU HAVE 6 OR MORE DRINKS ON ONE OCCASION: NEVER
AUDIT-C TOTAL SCORE: 0
SKIP TO QUESTIONS 9-10: 1
AUDIT-C TOTAL SCORE: 0

## 2024-02-16 NOTE — H&P
Obstetrical Admission History and Physical     Jackelin Umana is a 49 y.o.  at 37w5d. LURDES: 3/3/2024, by Ultrasound. Estimated fetal weight: 8 lb 10 oz. She has had prenatal care with Dr. Lewis .    Chief Complaint: Scheduled Induction    Assessment/Plan    50 y/o  at 37.5 wks presenting for IOL for A2GDM and cHTN not on meds.  FWB: reassuring, cat 1 tracing, EFW at 36.1 wks 3611g (97%), AC >99%, clinical EFW 8 lb 11 oz  MWB: overall reassuring, desires epidural  cHTN, no meds: bp mild range on admission, continue to monitor bp closely, cmp sent  A2GDM: on 26U NPH q hs, plan accuchecks q 4 hours, treat per protocol  H/O shoulder dystocia: at time of last delivery 5 years ago, baby was 9 lb 0 oz, pelvis previously proven to 9 lb 4 oz, previously counseled by multiple physicians including MFM, aware of recurrence risk of 15%, declined primary  delivery.  Declines  delivery today as well.  H/O PPH: plan type and cross for 2U pRBCs, starting hgb 12.0  GBS: neg    Mary Quintero MD    Principal Problem:    Term pregnancy      Pregnancy Problems (from 23 to present)       Problem Noted Resolved    Term pregnancy 2024 by Mary Quintero MD No    Priority:  Medium      Excessive fetal growth affecting management of pregnancy in third trimester 2024 by Valarie Paniagua MD No    Priority:  Medium      Overview Signed 2024  4:39 PM by Valarie Paniagua MD     -Last growth (): EFW 3611g (97%), AC > 99%, HC 14%          Gestational diabetes mellitus (GDM) in second trimester 2023 by Eri Lewis MD No    Priority:  Medium      Overview Addendum 2024 12:45 PM by Chandni Mahajan RN     Last growth (): EFW 3611g (97%), AC > 99%, HC 14%    2024   NPH inulin 14 At Bedtime    2024 NPH 14 at bedtime  2024 NPH 14 --> 20* at bedtime @ 36w1d   2024 : NPH 20 --> 26* at bedtime    For delivery in 37th week per MFM - scheduled for  at 8am with   Leon per patient request         History of postpartum hemorrhage 12/20/2023 by Eri Lewis MD No    Priority:  Medium      Anemia affecting pregnancy in third trimester 12/10/2023 by LIZZIE Mitchell No    Priority:  Medium      Overview Addendum 2/8/2024  4:51 PM by Valarie Paniagua MD     -Resolved on oral iron          High risk pregnancy, multigravida of advanced maternal age in third trimester 10/9/2023 by LIZZIE Marin No    Priority:  Medium      Overview Addendum 2/9/2024 10:52 AM by Valarie Paniagua MD     -Declines flu and COVID vaccinations  -s/p risk-reducing cfDNA  -IOL scheduled for 37.5wga on Friday 2/16 at 8am per patient request. Dr. Quintero aware         Chronic hypertension affecting pregnancy 10/9/2023 by LIZZIE Marin No    Priority:  Medium      Overview Addendum 2/9/2024  9:51 AM by Valarie Paniagua MD     -First noted at 15 weeks OB visit on 9/15/23   -Home BPs 120s/60s  -Baseline P:C 0.10  -Delivery at 37-38wga for GDM per MFM note 2/6               Options for delivery have been discussed with the patient and she elects for an induction of labor.  Cervical ripening with cytotec, cervidil, other prostaglandin agents has been discussed.  Induction of labor with pitocin, amniotomy, cytotec, and cervical balloon have been discussed in detail. The risks, benefits, complications, alternatives, expected outcomes, potential problems during recuperation and recovery, and the risks of not performing the procedure were discussed with the patient. The patient stated understanding that the risks of delivery include, but are not limited to: death; reaction to medications; injury to bowel, bladder, ureters, uterus, cervix, vagina, and other pelvic and abdominal structures, infection; blood loss and possible need for transfusion; and potential need for surgery, including hysterectomy. The risks of injury to the infant during delivery were also discussed. All  questions were answered. There was concurrence with the planned procedure, and the patient wanted to proceed.    Admit to inpatient status. I anticipate that this patient will require a stay exceeding at least 2 midnights for delivery and postpartum.  Induction of labor.  Management of pregnancy complications, as indicated.    Subjective   Good fetal movement. Denies vaginal bleeding.     Reason for Induction of Labor:  Gestational diabetes and cHTN not on meds    48 y/o  at 37.5 wks presenting for IOL for cHTN not on meds and A2GDM.  Pregnancy c/b  -cHTN not on meds  -A2GDM, on 26U NPH q hs, EFW 97% with AC 99% on last growth  -h/o SD at time of last delivery, declines primary  section  -h/o PPH, no transfusion, on iron for anemia  -AMA     Obstetrical History   OB History    Para Term  AB Living   11 7 7   3 7   SAB IAB Ectopic Multiple Live Births   3       7      # Outcome Date GA Lbr Alexandro/2nd Weight Sex Delivery Anes PTL Lv   11 Current            10 2022 7w0d          9 Term 18 40w0d  4.082 kg F Vag-Spont None  DAJA      Complications: Shoulder Dystocia   8 Term 16 39w0d  4.196 kg F Vag-Spont None  DAJA   7 Term 14 38w5d  4.139 kg F Vag-Spont EPI  DAJA   6 Term 02/10/12 39w0d  4.082 kg M Vag-Spont EPI  DAJA   5 Term 08 39w0d  4.167 kg F Vag-Spont None  DAJA   4 Term 07 39w0d  4.082 kg M Vag-Spont EPI  DAJA   3 Term 05 38w0d  4.111 kg M Vag-Spont EPI  DAJA   2 SAB  6w0d          1 SAB  6w0d              Past Medical History  Past Medical History:   Diagnosis Date    Gestational diabetes     Postpartum hypertension         Past Surgical History   History reviewed. No pertinent surgical history.    Social History  Social History     Tobacco Use    Smoking status: Former     Types: Cigarettes    Smokeless tobacco: Never   Substance Use Topics    Alcohol use: Not Currently     Substance and Sexual Activity   Drug Use Never       Allergies  Patient has no  "known allergies.     Medications  Medications Prior to Admission   Medication Sig Dispense Refill Last Dose    alcohol swabs pads, medicated Test 4 times daily and as needed 100 each 2     aspirin 81 mg EC tablet Take 2 tablets (162 mg) by mouth once daily.       blood sugar diagnostic strip Test 4 times daily and as needed 112 strip 4     FreeStyle glucose monitoring (FreeStyle Lite Meter) kit 1 each 4 times a day. 1 each 0     insulin NPH, Isophane, (HumuLIN N,NovoLIN N) 100 unit/mL (3 mL) injection Inject 10 Units under the skin once daily at bedtime. 5 each 4     lancets misc TEST 4 TIMES DAILY AND AS NEEDED 100 each 2     pen needle, diabetic (BD Ultra-Fine Short Pen Needle) 31 gauge x 5/16\" needle 1 each once daily at bedtime. 100 each 2     prenatal no122/iron/folic acid (PRENATAL MULTI ORAL) Take by mouth once every day.       Sharps Container 1 each if needed (Dispose of sharps in container). 1 each 4        Objective    Last Vitals  Temp Pulse Resp BP MAP O2 Sat     88 16 (!) 148/77         Physical Examination  GENERAL: Examination reveals a well developed, well nourished, gravid female in no acute distress. She is alert and cooperative.  ABDOMEN: soft, gravid, nontender, nondistended, no abnormal masses, no epigastric pain    , mod variability, +accels, no decels  [place] none  SVE 2/50/-2, vertex    BSUS: vertex    Lab Review  Results for orders placed or performed during the hospital encounter of 02/16/24 (from the past 24 hour(s))   CBC   Result Value Ref Range    WBC 7.7 4.4 - 11.3 x10*3/uL    nRBC 0.0 0.0 - 0.0 /100 WBCs    RBC 4.06 4.00 - 5.20 x10*6/uL    Hemoglobin 12.0 12.0 - 16.0 g/dL    Hematocrit 35.7 (L) 36.0 - 46.0 %    MCV 88 80 - 100 fL    MCH 29.6 26.0 - 34.0 pg    MCHC 33.6 32.0 - 36.0 g/dL    RDW 15.2 (H) 11.5 - 14.5 %    Platelets 165 150 - 450 x10*3/uL   POCT GLUCOSE   Result Value Ref Range    POCT Glucose 97 74 - 99 mg/dL         "

## 2024-02-16 NOTE — PROGRESS NOTES
Patient comfortable with epidural.  FHTs cat 1.  SVE 2.5/50/-2, AROM for clear fluid.  Continue pitocin.  Bps normal to mild range, normal labs.  Continue accuchecks q 4 hours.    Mary Quintero MD

## 2024-02-16 NOTE — PROGRESS NOTES
Late entry for 1600    Patient comfortable, not yet feeling pressure.   SVE 5/70/-2, contractions still every 4-5 minutes.  IUPC inserted to better titrate pitocin.  FHTs cat 1.  Continue to titrate pitocin.  Bps remain normal to mild range.  Bgs remain normal, no treatment needed currently.  Anticipate .    Mary Quintero MD

## 2024-02-16 NOTE — PROGRESS NOTES
Patient feeling pain in her right hip, requesting exam.  SVE 5-6/80/-1, small amount of change in dilation, effacement and station since last exam.  Pitocin just increased to 28 mu/min, not currently adequate.  Continue to titrate pitocin.      Mary Quintero MD

## 2024-02-16 NOTE — ANESTHESIA PREPROCEDURE EVALUATION
Patient: Jackelin Umana    Evaluation Method: In-person visit    Procedure Information    Date: 02/16/24  Procedure: Labor Analgesia         Relevant Problems   Cardiovascular   (+) Chronic hypertension affecting pregnancy      Endocrine   (+) Gestational diabetes mellitus (GDM) in second trimester      Hematology   (+) Anemia affecting pregnancy in third trimester       Clinical information reviewed:   Tobacco  Allergies  Meds   Med Hx  Surg Hx   Fam Hx          NPO Detail:  No data recorded     OB/Gyn Evaluation    Present Pregnancy    Patient is pregnant now.  (+) , gestational diabetes   Obstetric History    (+) hypertensive disorder of pregnancy, obstetric hemorrhage, other significant obstetric history (Shoulder dystocia)          Physical Exam    Airway  Mallampati: III  TM distance: >3 FB  Neck ROM: full     Cardiovascular - normal exam  Rhythm: regular  Rate: normal     Dental - normal exam     Pulmonary - normal exam  Breath sounds clear to auscultation     Abdominal      Other findings: Gravid      Anesthesia Plan    History of general anesthesia?: yes  History of complications of general anesthesia?: no    ASA 3     epidural     The patient is not a current smoker.    Anesthetic plan and risks discussed with patient.  Use of blood products discussed with patient who consented to blood products.

## 2024-02-16 NOTE — NURSING NOTE
Dr. Quintero called regarding TOCO not monitoring contraction pattern well. RN at bedside to verify contractions. The contractions are not palpaple. Dr. Quintero ok with continuing to increase Pitocin per protocol and she will place IUPC when she performs SVE next

## 2024-02-16 NOTE — ANESTHESIA PROCEDURE NOTES
Epidural Block    Patient location during procedure: OB  Start time: 2/16/2024 10:10 AM  End time: 2/16/2024 10:35 AM  Reason for block: labor analgesia  Staffing  Performed: CRNA   Authorized by: RAJ Liriano    Performed by: RAJ Liriano    Preanesthetic Checklist  Completed: patient identified, IV checked, site marked, risks and benefits discussed, surgical consent, pre-op evaluation, timeout performed and sterile techniques followed  Block Timeout  RN/Licensed healthcare professional reads aloud to the Anesthesia provider and entire team: Patient identity, procedure with side and site, patient position, and as applicable the availability of implants/special equipment/special requirements.  Patient on coagulant treatment: no  Timeout performed at: 2/16/2024 10:14 AM  Block Placement  Patient position: sitting  Prep: ChloraPrep  Sterility prep: cap, gloves, drape, mask and hand  Sedation level: no sedation  Patient monitoring: blood pressure, continuous pulse oximetry and heart rate  Approach: midline  Local numbing: lidocaine 1% to skin and subcutaneous tissues  Vertebral space: lumbar  Lumbar location: L3-L4  Epidural  Loss of resistance technique: air  Guidance: landmark technique        Needle  Needle type: Tuohy   Needle gauge: 17  Needle length: 10.2 cm  Needle insertion depth: 6 cm  Catheter type: multi-orifice  Catheter size: 19 G  Catheter at skin depth: 11 cm  Catheter securement method: clear occlusive dressing    Test dose: lidocaine 1.5% with epinephrine 1-to-200,000  Test dose: lidocaine 1.5% with epinephrine 1-to-200,000  Test dose result: no positive test dose    PCEA  Medication concentration used: 0.2% Ropivacaine with 2 mcg/mL Fentanyl  Dose (mL): 5  Lockout (minutes): 20  1-Hour Limit (boluses/hr): 3  Basal Rate: 10        Assessment  Block outcome: patient comfortable  Number of attempts: 2  Events: blood aspirated and no positive test dose  Procedure  assessment: patient tolerated procedure well with no immediate complications  Additional Notes  Pt is a grand multip. Requested epidural early because of history of precipitous deliveries, history of shoulder dystocia and post partum hemorrhage.  Epidural was placed and test dose given.   Epidural was capped until ROM or pain.

## 2024-02-17 LAB — GLUCOSE BLD MANUAL STRIP-MCNC: 115 MG/DL (ref 74–99)

## 2024-02-17 PROCEDURE — 1120000001 HC OB PRIVATE ROOM DAILY

## 2024-02-17 PROCEDURE — 2500000001 HC RX 250 WO HCPCS SELF ADMINISTERED DRUGS (ALT 637 FOR MEDICARE OP): Performed by: OBSTETRICS & GYNECOLOGY

## 2024-02-17 PROCEDURE — 82947 ASSAY GLUCOSE BLOOD QUANT: CPT

## 2024-02-17 RX ORDER — ACETAMINOPHEN 325 MG/1
650 TABLET ORAL EVERY 6 HOURS PRN
Qty: 30 TABLET | Refills: 3 | Status: CANCELLED | OUTPATIENT
Start: 2024-02-17 | End: 2024-03-18

## 2024-02-17 RX ORDER — POLYETHYLENE GLYCOL 3350 17 G/17G
17 POWDER, FOR SOLUTION ORAL 2 TIMES DAILY PRN
Qty: 30 EACH | Refills: 3 | Status: CANCELLED | OUTPATIENT
Start: 2024-02-17 | End: 2024-04-17

## 2024-02-17 RX ORDER — AMLODIPINE BESYLATE 5 MG/1
5 TABLET ORAL DAILY
Status: DISCONTINUED | OUTPATIENT
Start: 2024-02-17 | End: 2024-02-18 | Stop reason: HOSPADM

## 2024-02-17 RX ADMIN — AMLODIPINE BESYLATE 5 MG: 5 TABLET ORAL at 10:20

## 2024-02-17 ASSESSMENT — PAIN SCALES - GENERAL
PAINLEVEL_OUTOF10: 0 - NO PAIN

## 2024-02-17 NOTE — PROGRESS NOTES
Spiritual Care Visit    Clinical Encounter Type  Visited With: Patient and family together  Routine Visit: Introduction          Purpose of visit was to congratulate mom and baby and offer the keepsake Bible.  Patient was present with  and other daughters.  Mom confirmed they are Sikhism and I have updated the patient record.  Mom gladly accepted the Bible.  Family was very kind and engaged in conversation with me.

## 2024-02-17 NOTE — L&D DELIVERY NOTE
OB Delivery Note  2024  Jackelin Umana  49 y.o.   Vaginal, Spontaneous        Gestational Age: 37w5d  /Para:   Quantitative Blood Loss: Admission to Discharge: 875 mL (2024  8:04 AM - 2024  6:29 AM)    Gladys Umana [[id number]]      Labor Events    Sac identifier: Sac 1  Rupture date/time: 2024 1200  Rupture type: Artificial  Fluid color: Clear  Fluid odor: None  Labor type: Induced Onset of Labor  Labor allowed to proceed with plans for an attempted vaginal birth?: Yes  Induction: Oxytocin, AROM  Induction indications: Diabetes  Complications: None       Labor Event Times    Labor onset date/time: 2024 09  Dilation complete date/time: 2024  Start pushing date/time: 2024       Labor Length    1st stage: 9h 50m  2nd stage: 0h 04m  3rd stage: 0h 16m       Placenta    Placenta delivery date/time: 2024 191  Placenta removal: Manual removal  Placenta appearance: Intact  Placenta disposition: pathology       Cord    Vessels: 3 vessels  Complications: None  Delayed cord clamping?: Yes  Cord blood disposition: Lab  Gases sent?: No  Stem cell collection (by provider): No       Lacerations    Episiotomy: None  Other lacerations?: No  Repair suture: None       Anesthesia    Method: Epidural       Operative Delivery    Forceps attempted?: No  Vacuum extractor attempted?: No       Shoulder Dystocia    Shoulder dystocia present?: No       [place] Delivery    Time head delivered: 2024 19:42:46  Birth date/time: 2024 18:54:00  Delivery type: Vaginal, Spontaneous  Complications: None       Resuscitation    Method: None       Apgars    Living status: Living  Apgar Component Scores:  1 min.:  5 min.:  10 min.:  15 min.:  20 min.:    Skin color:  1  1       Heart rate:  2  2       Reflex irritability:  2  2       Muscle tone:  2  2       Respiratory effort:  2  2       Total:  9  9       Apgars assigned by: KEO CLIFTON       Delivery Providers    Delivering  clinician: Mary Quintero MD   Provider Role    Nickie Smith, RN Delivery Nurse    Lacey Salmeron, RN Nursery Nurse    Miranda Noel, RN Delivery Nurse               Uncomplicated  after presenting for IOL for cHTN and A2GDM. Induced with pitocin/AROM.  Pushed twice to deliver a vigorous female infant in OA presentation, shoulders and body delivered easily and infant placed on maternal chest.  Delayed cord clamping performed and cut by father of baby at 4 min of life.  Placenta did not deliver after 20 minutes and was removed manually and intact without complication.  No lacerations.  Small vaginal abrasion hemostatic and not repaired.    Mary Quintero MD

## 2024-02-17 NOTE — CARE PLAN
The patient's goals for the shift include meet baby    The clinical goals for the shift include tolerate induction of labor      Problem: Antepartum  Goal: Maintain pregnancy as long as maternal and/or fetal condition is stable  Outcome: Met  Flowsheets (Taken 2/16/2024 1944)  Maintain pregnancy as long as maternal and/or fetal condition is stable:   Maternal surveillance   Fetal surveillance   Monitor uterine activity   Medications as ordered  Goal: Avoid/minimize constipation  Outcome: Met  Flowsheets (Taken 2/16/2024 1944)  Avoid/minimize constipation: Med administration/monitoring of effect  Goal: No decrease in circulation/VTE  Outcome: Met  Flowsheets (Taken 2/16/2024 1944)  No decrease in circulation/VTE: Ambulate/OOB 3 times daily and/or SCD use  Goal: FHR remains reassuring  Outcome: Met  Flowsheets (Taken 2/16/2024 1944)  FHR remains reassuring:   Fetal monitoring , intermittent   Fetal monitoring, continuous   doppler fetal heart rate checks  Goal: Minimize anxiety/maximize coping  Outcome: Met  Flowsheets (Taken 2/16/2024 1944)  Minimize anxiety/maximize coping:   Clustered care, diversional tx as desired, consult(s) PRN   Friend/family support and/or input in POC and/or primary RN team   Education on s/sx to report to RN/team   Referrals PRN ie. spiritual care, SW, MFM, lactation

## 2024-02-17 NOTE — PROGRESS NOTES
Postpartum Progress Note    Assessment/Plan   Jackelin Umana is a 49 y.o., , who delivered at 37w5d gestation and is now postpartum day 1 with cHTN    #cHTN  -one severe and several high  milds BP  -no sx's of sPEC  -HELLP labs normal  -will start amlodipine 5 mg daily    #pp  Rh pos  rubella immune  Breastfeeding    #GDMA2  Somewhat fasting blood sugar 115, not diabetes but will need 2 hr ogtt with pp visit and follow up    Principal Problem:    Term pregnancy    Pregnancy Problems (from 23 to present)       Problem Noted Resolved    Term pregnancy 2024 by Mary Quintero MD No    Priority:  Medium      Excessive fetal growth affecting management of pregnancy in third trimester 2024 by Valarie Paniagua MD No    Priority:  Medium      Overview Signed 2024  4:39 PM by Valarie Paniagua MD     -Last growth (): EFW 3611g (97%), AC > 99%, HC 14%          Gestational diabetes mellitus (GDM) in second trimester 2023 by Eri eLwis MD No    Priority:  Medium      Overview Addendum 2024 12:45 PM by Chandin Mahajan RN     Last growth (): EFW 3611g (97%), AC > 99%, HC 14%    2024   NPH inulin 14 At Bedtime    2024 NPH 14 at bedtime  2024 NPH 14 --> 20* at bedtime @ 36w1d   2024 : NPH 20 --> 26* at bedtime    For delivery in 37th week per [facility] - scheduled for  at 8am with Dr. Quintero per patient request         History of postpartum hemorrhage 2023 by Eri Lewis MD No    Priority:  Medium      Anemia affecting pregnancy in third trimester 12/10/2023 by LIZZIE Mitchell No    Priority:  Medium      Overview Addendum 2024  4:51 PM by Valarie Paniagua MD     -Resolved on oral iron          High risk pregnancy, multigravida of advanced maternal age in third trimester 10/9/2023 by JOSE MIGUEL Marin-TORITO No    Priority:  Medium      Overview Addendum 2024 10:52 AM by Valarie Paniagua MD     -Declines flu and COVID  vaccinations  -s/p risk-reducing cfDNA  -IOL scheduled for 37.5wga on Friday 2/16 at 8am per patient request. Dr. Quintero aware         Chronic hypertension affecting pregnancy 10/9/2023 by JOSE MIGUEL Marin-TORITO No    Priority:  Medium      Overview Addendum 2/9/2024  9:51 AM by Valarie Paniagua MD     -First noted at 15 weeks OB visit on 9/15/23   -Home BPs 120s/60s  -Baseline P:C 0.10  -Delivery at 37-38wga for GDM per MFM note 2/6               Hospital course: chronic hypertension  Vaginal Birth  Patient is currently breastfeedingThe patient's blood type is O POS. The baby's blood type is A POS . Rhogam is not indicated.    Subjective   Her pain is well controlled with current medications  She is passing flatus  She is ambulating well  She is tolerating a Adult diet Regular  She reports no breast or nursing problems  She denies emotional concerns today        One severe range BP several mild range.  Does not endorse headache, vision change, RUQ pain, dyspnea, or chest pain.      Objective   Allergies:   Patient has no known allergies.         Last Vitals:  Temp Pulse Resp BP MAP Pulse Ox   36.9 °C (98.4 °F) 82 15 (!) 146/83   (!) 92 %     Vitals Min/Max Last 24 Hours:  Temp  Min: 36.7 °C (98.1 °F)  Max: 37.1 °C (98.8 °F)  Pulse  Min: 78  Max: 116  Resp  Min: 15  Max: 18  BP  Min: 130/71  Max: 175/93    Intake/Output:     Intake/Output Summary (Last 24 hours) at 2/17/2024 0925  Last data filed at 2/16/2024 2330  Gross per 24 hour   Intake --   Output 4275 ml   Net -4275 ml       Physical Exam:  Visit Vitals  BP (!) 146/83   Pulse 82   Temp 36.9 °C (98.4 °F) (Oral)   Resp 15       Gen: well nourished, NAD  CV: RRR no murmur  Resp: lungs clear to auscultation b/l  Abd: softly distended, nontender no organomegaly  Fundus firm nontender below umbilicus  Ext: no edema, nontender, negative Brock's        Lab Data:  Lab Results   Component Value Date    WBC 7.7 02/16/2024    HGB 12.0 02/16/2024    HCT 35.7 (L)  02/16/2024     02/16/2024

## 2024-02-17 NOTE — LACTATION NOTE
Lactation Consultant Note  Lactation Consultation  Reason for Consult: Initial assessment  Consultant Name: Janel Dickey    Maternal Information  Has mother  before?: Yes  How long did the mother previously breastfeed?: 7 older children, oldest 19. BF all for >12 months  Previous Maternal Breastfeeding Challenges: Tongue tie  Infant to breast within first 2 hours of birth?: Yes  Exclusive Pump and Bottle Feed: No    Maternal Assessment  Breast Assessment: Medium, Pendulous, Soft  Nipple Assessment: Intact, Erect, Large diameter, Bruised, Rounded after feeding  Alterations in Nipple Condition: Stage I - pain or irritation with no skin break down  [place] Assessment: Normal    Infant Assessment  Infant Behavior: Sleepy, Rooting response, Suckles on and off, needs stimulation  Infant Assessment: Tongue protrudes over alveolar ridge, Able to elevate tongue to roof of mouth    Feeding Assessment  Nutrition Source: Breastmilk, Donor human milk  Feeding Method: Nursing at the breast, Syringe feeding  Feeding Position: Cradle, Mother demonstrates good positioning  Suck/Feeding: Disorganized suck  Latch Assessment: Latch achieved after repeated attempts, Wide open mouth < 160    LATCH TOOL  Latch: Repeated attempts, hold nipple in mouth, stimulate to suck  Audible Swallowing: None  Type of Nipple: Everted (After stimulation)  Comfort (Breast/Nipple): Filling, red/small blisters/bruises, mild/moderate discomfort  Hold (Positioning): No assist from staff, mother able to position/hold infant  LATCH Score: 6    Breast Pump       Other OB Lactation Tools       Patient Follow-up  Inpatient Lactation Follow-up Needed : Yes  Outpatient Lactation Follow-up: Recommended    Other OB Lactation Documentation  Maternal Risk Factors: Diabetes (gestational, types 1 or 2), Hypertension  Infant Risk Factors: Early term birth 37-39 weeks    Recommendations/Summary  , 37.5 weeks,  on @1854. Birthweight 3495. Mother GDM, CHTN.  Low BG x1, OGG and DHM supplementation. BG stable, supplementation discontinued, feeding on demand. Mother experienced, all older children BF for at least 12 months. Reports #6 and #7 had tongue ties, 6th very severe, both revised at [facility]. Report this infant bruised nipple with first feed, latch has improved since, denies pain.   Oral exam done, lingual frenulum attaches midline, visible with maneuver. Tongue extends past alveolar ridge and elevates to roof of mouth. Good cupping and strong suction noted initially, then uncoordinated sucking with snap back noted.   Mother demonstrates deep latching in cradle hold. Infant puts mouth around the breast, disorganized suck, minimal swallowing noted. Mother reports infant fed 1.5 hours ago. Encouraged skin to skin, will return when infant is more hungry.    Plan:  Cue based feeding, at least 8-12 times in 24 hours  Frequent skin to skin  Hand express for extra volume  Call for assistance as needed    Educated parents on skin to skin, hand expression, hunger cues and feeding frequency/patterns. Discussed expected output, weight loss <10%, and normal bilirubin. Educated on AAP pacifier recommendations. Reviewed outpatient resources.

## 2024-02-17 NOTE — CARE PLAN
The patient's goals for the shift include bond with baby    The clinical goals for the shift include patient blood pressure to remain stable    Problem: Vaginal Birth or  Section  Goal: Fetal and maternal status remain reassuring during the birth process  Outcome: Met  Goal: Tolerate CRB for IOL placement maintenance until dislodgement/removal 12hrs after placement  Outcome: Met  Goal: Prevention of malpresentation/labor dystocia through delivery  Outcome: Met  Goal: Demonstrates labor coping techniques through delivery  Outcome: Met  Goal: Minimal s/sx of HDP and BP<160/110  Outcome: Met  Goal: No s/sx of infection through recovery  Outcome: Met  Goal: No s/sx of hemorrhage through recovery  Outcome: Met

## 2024-02-17 NOTE — CARE PLAN
The patient's goals for the shift include bond with baby    The clinical goals for the shift include patient blood pressure to remain stable    Problem: Postpartum  Goal: Experiences normal postpartum course  Outcome: Progressing  Flowsheets (Taken 2024)  Experiences normal postpartum course:   Monitor maternal vital signs   Assess uterine involution   Med administration/monitoring of effect   Fundal and lochia asssessments w/fundal massage, bladder emptying  Goal: Appropriate maternal -  bonding  Outcome: Progressing  Flowsheets (Taken 2024)  Appropriate maternal- bonding:   Identify family support   24-hour rooming in  Goal: Establish and maintain infant feeding pattern for adequate nutrition  Outcome: Progressing  Flowsheets (Taken 2024)  Establish and maintain infant feeding pattern for adequate nutrition: Refer to lactation consultant as needed     Problem: Hypertensive Disorder of Pregnancy (HDP)  Goal: Minimal s/sx of HDP and BP<160/110  Outcome: Progressing  Flowsheets (Taken 2024)  Minimal s/sx of HDP and BP <160/110: Monitor for s/sx of worsening HDP     Problem: Pain - Adult  Goal: Verbalizes/displays adequate comfort level or baseline comfort level  Outcome: Progressing  Flowsheets (Taken 2024)  Verbalizes/displays adequate comfort level or baseline comfort level:   Assess pain using appropriate pain scale   Administer analgesics based on type and severity of pain and evaluate response     Problem: Safety - Adult  Goal: Free from fall injury  Outcome: Progressing  Flowsheets (Taken 2024)  Free from fall injury:   Instruct family/caregiver on patient safety   Based on caregiver fall risk screen, instruct family/caregiver to ask for assistance with transferring infant if caregiver noted to have fall risk factors

## 2024-02-17 NOTE — ANESTHESIA POSTPROCEDURE EVALUATION
Patient: Jackelin Umana    Procedure Summary       Date: 02/16/24 Room / Location:     Anesthesia Start: 1010 Anesthesia Stop: 1854    Procedure: Labor Analgesia Diagnosis:     Scheduled Providers:  Responsible Provider: RAJ Liriano    Anesthesia Type: epidural ASA Status: 3            Anesthesia Type: epidural    Visit Vitals  BP (!) 142/67   Pulse 95   Temp 36.7 °C (98.1 °F) (Oral)   Resp 16    SPO2   98%    Anesthesia Post Evaluation    Patient location during evaluation: bedside  Patient participation: complete - patient participated  Level of consciousness: awake and alert  Pain score: 0  Pain management: adequate  Multimodal analgesia pain management approach  Airway patency: patent  Cardiovascular status: acceptable  Respiratory status: acceptable  Hydration status: acceptable  Postoperative Nausea and Vomiting: none  Comments: Epidural catheter removed by nursing. No redness, swelling, or drainage at puncture site.    Complete resolution of numbness. Patient is able to lift legs, bend at the knees, and ambulate.    Patient denies problems with urination.    Patient denies headache or severe back pain.       No notable events documented.

## 2024-02-18 VITALS
SYSTOLIC BLOOD PRESSURE: 139 MMHG | BODY MASS INDEX: 29.86 KG/M2 | OXYGEN SATURATION: 96 % | DIASTOLIC BLOOD PRESSURE: 87 MMHG | HEIGHT: 68 IN | RESPIRATION RATE: 16 BRPM | HEART RATE: 83 BPM | WEIGHT: 197 LBS | TEMPERATURE: 98.1 F

## 2024-02-18 PROBLEM — O09.523 HIGH RISK PREGNANCY, MULTIGRAVIDA OF ADVANCED MATERNAL AGE IN THIRD TRIMESTER (HHS-HCC): Status: RESOLVED | Noted: 2023-10-09 | Resolved: 2024-02-18

## 2024-02-18 PROBLEM — Z64.1 GRAND MULTIPARA: Status: RESOLVED | Noted: 2023-10-09 | Resolved: 2024-02-18

## 2024-02-18 PROBLEM — O36.63X0 EXCESSIVE FETAL GROWTH AFFECTING MANAGEMENT OF PREGNANCY IN THIRD TRIMESTER (HHS-HCC): Status: RESOLVED | Noted: 2024-02-08 | Resolved: 2024-02-18

## 2024-02-18 PROBLEM — Z34.90 TERM PREGNANCY (HHS-HCC): Status: RESOLVED | Noted: 2024-02-16 | Resolved: 2024-02-18

## 2024-02-18 PROBLEM — Z87.59 HISTORY OF POSTPARTUM HEMORRHAGE: Status: RESOLVED | Noted: 2023-12-20 | Resolved: 2024-02-18

## 2024-02-18 PROBLEM — Z87.59 HISTORY OF SHOULDER DYSTOCIA IN PRIOR PREGNANCY: Status: RESOLVED | Noted: 2023-10-09 | Resolved: 2024-02-18

## 2024-02-18 LAB
BLOOD EXPIRATION DATE: NORMAL
BLOOD EXPIRATION DATE: NORMAL
DISPENSE STATUS: NORMAL
DISPENSE STATUS: NORMAL
PRODUCT BLOOD TYPE: 5100
PRODUCT BLOOD TYPE: 5100
PRODUCT CODE: NORMAL
PRODUCT CODE: NORMAL
UNIT ABO: NORMAL
UNIT ABO: NORMAL
UNIT NUMBER: NORMAL
UNIT NUMBER: NORMAL
UNIT RH: NORMAL
UNIT RH: NORMAL
UNIT VOLUME: 350
UNIT VOLUME: 350
XM INTEP: NORMAL
XM INTEP: NORMAL

## 2024-02-18 PROCEDURE — 2500000001 HC RX 250 WO HCPCS SELF ADMINISTERED DRUGS (ALT 637 FOR MEDICARE OP): Performed by: OBSTETRICS & GYNECOLOGY

## 2024-02-18 RX ORDER — AMLODIPINE BESYLATE 5 MG/1
5 TABLET ORAL DAILY
Qty: 30 TABLET | Refills: 3 | Status: SHIPPED | OUTPATIENT
Start: 2024-02-18 | End: 2024-04-17 | Stop reason: WASHOUT

## 2024-02-18 RX ORDER — IBUPROFEN 600 MG/1
600 TABLET ORAL EVERY 6 HOURS PRN
Qty: 20 TABLET | Refills: 0 | Status: SHIPPED | OUTPATIENT
Start: 2024-02-18 | End: 2024-04-17 | Stop reason: WASHOUT

## 2024-02-18 RX ADMIN — AMLODIPINE BESYLATE 5 MG: 5 TABLET ORAL at 09:09

## 2024-02-18 ASSESSMENT — PAIN SCALES - GENERAL: PAINLEVEL_OUTOF10: 0 - NO PAIN

## 2024-02-18 NOTE — DISCHARGE SUMMARY
Discharge Summary    Admission Date: 2024  Discharge Date: 24    Discharge Diagnosis  37.4 wks CHTN, GDMA2.      Hospital Course  Delivery Date: 2024  6:54 PM   Delivery type: Vaginal, Spontaneous    GA at delivery: 37w5d  Outcome: Living   Anesthesia during delivery: Epidural   Intrapartum complications: None   Feeding method: Breastfeeding Status: Yes     Procedures:        Pertinent Physical Exam At Time of Discharge    Fundus: below umbilicus.  Extremities: no redness or tenderness in the calves or thighs, no edema.  Psychological: awake and alert; oriented to person, place, and time.    Discharge Meds     Your medication list        START taking these medications        Instructions Last Dose Given Next Dose Due   amLODIPine 5 mg tablet  Commonly known as: Norvasc      Take 1 tablet (5 mg) by mouth once daily.       ibuprofen 600 mg tablet      Take 1 tablet (600 mg) by mouth every 6 hours if needed for mild pain (1 - 3).       Iron tablet , take one daily x 1 week        STOP taking these medications      insulin NPH (Isophane) 100 unit/mL (3 mL) injection  Commonly known as: HumuLIN N,NovoLIN N                  Where to Get Your Medications        These medications were sent to [facility] - [place], [place] - [address] [address] [facility]  [address] [address] [facility] [address]      Phone: [phone]   amLODIPine 5 mg tablet  ibuprofen 600 mg tablet          Complications Requiring Follow-Up  CHTN start Amlodipine 5 mg daily see me in 1 week   Gest Diabetes, needs 6 week glucola test     Test Results Pending At Discharge  Pending Labs       Order Current Status    Surgical Pathology Exam - [facility]AC[facility]A Collected (24)            Outpatient Follow-Up  Future Appointments   Date Time Provider Department Center   4/3/2024  2:00 PM Eri Lewis MD [facility] Gautam SWANSON spent 15 minutes in the professional and overall care of this patient.  Home care discussed. Sx HTN discussed.      Eri Lewis MD

## 2024-02-18 NOTE — CARE PLAN
The patient's goals for the shift include bond with baby    The clinical goals for the shift include patient blood pressure to remain stable    Over the shift, the patient did make progress toward the following goals. Barriers to progression include none. Recommendations to address these barriers include na.

## 2024-02-18 NOTE — CARE PLAN
The patient's goals for the shift include bond with baby    The clinical goals for the shift include stable vs    Over the shift, the patient did make progress toward the following goals. Barriers to progression include none. Recommendations to address these barriers include na.

## 2024-02-18 NOTE — CARE PLAN
The patient's goals for the shift include bond with baby    The clinical goals for the shift include bond with baby    Problem: Hypertensive Disorder of Pregnancy (HDP)  Goal: Minimal s/sx of HDP and BP<160/110  Outcome: Met  Flowsheets (Taken 2/17/2024 1825)  Minimal s/sx of HDP and BP <160/110: Monitor for s/sx of worsening HDP     Problem: Safety - Adult  Goal: Free from fall injury  Outcome: Met  Flowsheets (Taken 2/17/2024 1825)  Free from fall injury:   Instruct family/caregiver on patient safety   Based on caregiver fall risk screen, instruct family/caregiver to ask for assistance with transferring infant if caregiver noted to have fall risk factors

## 2024-02-20 ENCOUNTER — APPOINTMENT (OUTPATIENT)
Dept: RADIOLOGY | Facility: CLINIC | Age: 49
End: 2024-02-20
Payer: COMMERCIAL

## 2024-02-21 LAB
CLINICAL SIG UPDATED INFO: NORMAL
LABORATORY COMMENT REPORT: NORMAL
PATH REPORT.FINAL DX SPEC: NORMAL
PATH REPORT.GROSS SPEC: NORMAL
PATH REPORT.RELEVANT HX SPEC: NORMAL
PATH REPORT.TOTAL CANCER: NORMAL

## 2024-02-22 ENCOUNTER — APPOINTMENT (OUTPATIENT)
Dept: OBSTETRICS AND GYNECOLOGY | Facility: CLINIC | Age: 49
End: 2024-02-22
Payer: COMMERCIAL

## 2024-02-23 ENCOUNTER — LACTATION CONSULT (OUTPATIENT)
Dept: LACTATION | Facility: CLINIC | Age: 49
End: 2024-02-23
Payer: COMMERCIAL

## 2024-02-23 PROCEDURE — 99211 OFF/OP EST MAY X REQ PHY/QHP: CPT | Performed by: EMERGENCY MEDICINE

## 2024-02-23 NOTE — PROGRESS NOTES
Lactation Counseling Note    Subjective:    Jackelin Umana is a 49 y.o. patient referred for lactation counseling. She is here today due to Latch issues. She was referred by her  self, has been to Lactation center with other children .     OB HISTORY:   Baby Name: Sanjana  Healthcare Provider: OB/GYN Dr Rob  /Para: : 11  Para: 8  Weeks Gestation: 37.5  Mode of Delivery: Normal vaginal route  Induction/Augmentation  Epidural/General Anesthesia  Delivery Complications: None  Maternal Complications: Preeclampsia  GDM  [place] Information: Baby's Name: Vickie  : 24  [facility] Birth: [facility]  Healthcare Provider:    Skin to skin contact: First hour  Birth weight: 7 lb 11 oz.    Objective:    BREASTFEEDING ASSESSMENT:   Physical Exam    Breast Assessment: Large, Pendulous, Filling, Breast changes observed in pregnancy? Yes, describe: larger, more veins, Compressible, and Readiness to feed  Nipple Assessment/Stage: intact, large diameter, erect, and long none  Areola: Normal  Feeding Position: baby - led, c - hold, cradle, cross - cradle, laid back, straddle, skin to skin, nipple to nose, mother needs assistance with latch/positioning, nose or chin not touching breast, and misalignment of baby's head, trunk, and hips  Latch: no latch achieved, reluctant, maximum assistance is needed, cries while latching, shallow latch, and mouth not open wide enough  Suck/Feeding: unsustained, tactile stimulation needed, non-nutritional sucking, and no sustained latch  Alternative Feeding Methods: paced bottle  Feeding and Cleaning instructions reviewed for: Paced bottle  Infant Feeding Method: Breast milk and Formula  Were you feeding your baby only breast milk when you were discharged from the hospital at the time of birth? Yes    Age of baby while still breastfeeding, but added formula: 3 days  Infant Behavior: light sleep, crying, fussy, readiness to feed, feeding cues observed, and rooting response  Infant  Information: Jaundice  Post status frenotomy  Receding chin  Tongue humped/bunched/retracted/elevated  Good cupping of tongue  Fontanel: flat  Mucous Membranes: moist    Weight: Weight before feedin lb 1.8 oz.   Weight after feeding: no latch  Number of voids in the last 24 hours: 6-8  Number of stools in the last 24 hours: 0    PATIENT DISCUSSION SUMMARY: Mom and baby Vickie here for initial visit. This is Mom's eighth baby. Infant had tongue and lip ties released at [facility] yesterday. Mom is experienced with 2 of her children having lip and tongue ties. Mom contacted [facility] independently. Mom states infant latched somewhat alright while in hospital, but has not been latching well once home. Mom has been pumping and giving expressed milk plus formula via bottle. Infant weighed prior to feed. Infant is 7 lb. 1.8 oz. Today. Infant appears jaundice and is near term at 37.5 weeks gestation. Assisted to breast. Milk is easily expressible. Mom has large diameter and long nipples. Infant crying when latching and noted to hold tip of tongue up towards roof of mouth. Difficult to get nipple on top of tongue for correct latch. Mom reports some difficulty with bottle as well. Suck assessed. Infant has string, rhythmic suck once finger is over tongue and palate is stimulate. Attempted several more times at breast with fiirst sucking on finger near nipple then attempting latch before infant cried with tongue up within mouth. Infant briefly latched, but no sustained sucking or swallows. Assisted to manually express milk to use in syringe at breast to motivate infant to sustain latch without success. Attempted to use nipple shield in hopes that tongue would be lower by shield and assist infant to transfer milk. Nipple shield not successful. Assisted Mom to use Ameda pump while her. Mom obtained 26mls. This was given to infant via paced bottle. Discussed and shown how to encourage wide gape before latching to  "bottle nipple. Encouraged skin to skin as much as possible to be able to catch infant with early signs of hunger and attempt to latch with wide gape, tongue down, not crying. Plan is for Mom to focus on building supply and working with infant to bring tongue down to latch to breast or bottle. Mom to attempt at breast , but if not successful after 5 minutes or so of attempts, then give all the expressed breast milk available, so approx. 1-1.5 ounces. If baby does not appear satisfied then give 1/2 oz. More for total of 2 oz. Feed q2-3 hours, at least 8 times in 24 hours and pump after each feding. Mom was using Medela pump, but obtained Spectra with this baby and encouraged to begin using. Mom given feeding and output log to record progress. Mom aware that increase in stools is expected to assure that infant is feeding well enough. Release sites assessed and aftercare stretches demonstrated. Plan to return here 24 for follow up weight check and latch assistance.     LACTATION PROBLEMS AND STRATEGIES:  Late / near term: Avoid over stimulation  Baby may need extra time and help with feedings  Baby may tire easily  Baby should be gaining 1/2-1 oz per day  Express breast milk to maintain milk supply  Keep a feeding & pumping log  Keep baby \"skin to skin\"   Listen or feel for swallows every 1-3 sucks  Massage your breast during feedings  Monitor baby's weight gain- Support group  PI sheet- Tips to Increase Milk Supply  PI sheet- Is My  Baby Getting Enough  Provide extra support while feeding  Pumping should continue until baby 40-42 weeks  Supplement with expressed breast milk if necessary  Try  \"power pumping\"  Use clutch, cross cradle or semi-reclined feeding position  Use nipple shield to assist with latch  Watch for feeding cues and nurse baby (8-12 times or more in 24 hours)  Problems latching baby to breast: Baby should latch to areola (dark area) not just the nipple.  Baby's lips should " be flanged outward.  Bring the baby up to the level of your breast by putting a pillow under the baby.  Do not use bottles or pacifiers until latching on well.  Dribble milk over the nipple or express milk so that baby can taste it  Encourage a deeper latch with the asymetrical latch- lining baby's nose opposite mother's nipple.  Encourage nipple to stand up prior to feeing by pumping, nipple rolling or by brief application of ice.  Gently tickle your baby's lip with your nipple to encourage your baby to open his/her mouth wide.  Hold baby so that your breast is positioned deep in the baby's mouth.  Hold your baby close, tummy to tummy.  If baby does not latch to breast, express breast milk.  If engorged, express some milk to soften breast.  If the baby is not latched on well, break seal and gently try again.  Keep baby skin to skin and watch for feeding cues.  Massage breast to start milk-ejection reflex.  Place nipple and at least 1 inch of areola into baby's mouth.  Place your hand behind the baby's neck and shoulder.  Do not force baby's head into breast.  Put baby in the football hold or clutch hold, supporting his/her neck and head in sniffing position to open his/her throat.  Shape breast into oval shape (sandwich hold)  for deep latch.  Try different feeding positions (cradle, football, side etc.).  Use a breast feeding pillow to bring baby up to the level of the breast.  Use nipple shield and monitor baby's weight gain and output.  Use semi-reclined feeding position to allow baby to take an active role and trigger baby's inborn feeding behavior.  Use your hand to support your breast during feeding.  When your baby's mouth is wide open, quickly pull baby into your breast.  Your baby's chin should be pressed into your breast.  PATIENT INSTRUCTION HANDOUTS GIVEN:      LACTATION EDUCATION:  Waking Techniques, Correct Positioning, Correct Latch On, and Demo use of Pump

## 2024-02-23 NOTE — PATIENT INSTRUCTIONS
Mom and baby Vickie here for initial visit. This is Mom's eighth baby. Infant had tongue and lip ties released at [facility] yesterday. Mom is experienced with 2 of her children having lip and tongue ties. Mom contacted [facility] independently. Mom states infant latched somewhat alright while in hospital, but has not been latching well once home. Mom has been pumping and giving expressed milk plus formula via bottle. Infant weighed prior to feed. Infant is 7 lb. 1.8 oz. Today. Infant appears jaundice and is near term at 37.5 weeks gestation. Assisted to breast. Milk is easily expressible. Mom has large diameter and long nipples. Infant crying when latching and noted to hold tip of tongue up towards roof of mouth. Difficult to get nipple on top of tongue for correct latch. Mom reports some difficulty with bottle as well. Suck assessed. Infant has string, rhythmic suck once finger is over tongue and palate is stimulate. Attempted several more times at breast with fiirst sucking on finger near nipple then attempting latch before infant cried with tongue up within mouth. Infant briefly latched, but no sustained sucking or swallows. Assisted to manually express milk to use in syringe at breast to motivate infant to sustain latch without success. Attempted to use nipple shield in hopes that tongue would be lower by shield and assist infant to transfer milk. Nipple shield not successful. Assisted Mom to use Ameda pump while her. Mom obtained 26mls. This was given to infant via paced bottle. Discussed and shown how to encourage wide gape before latching to bottle nipple. Encouraged skin to skin as much as possible to be able to catch infant with early signs of hunger and attempt to latch with wide gape, tongue down, not crying. Plan is for Mom to focus on building supply and working with infant to bring tongue down to latch to breast or bottle. Mom to attempt at breast , but if not successful after 5 minutes or  so of attempts, then give all the expressed breast milk available, so approx. 1-1.5 ounces. If baby does not appear satisfied then give 1/2 oz. More for total of 2 oz. Feed q2-3 hours, at least 8 times in 24 hours and pump after each feding. Mom was using [employer] pump, but obtained Spectra with this baby and encouraged to begin using. Mom given feeding and output log to record progress. Mom aware that increase in stools is expected to assure that infant is feeding well enough. Release sites assessed and aftercare stretches demonstrated. Plan to return here Tuesday 2-27-24 for follow up weight check and latch assistance.

## 2024-02-26 ENCOUNTER — TELEMEDICINE (OUTPATIENT)
Dept: OBSTETRICS AND GYNECOLOGY | Facility: CLINIC | Age: 49
End: 2024-02-26
Payer: COMMERCIAL

## 2024-02-26 DIAGNOSIS — O10.919 CHRONIC HYPERTENSION AFFECTING PREGNANCY (HHS-HCC): ICD-10-CM

## 2024-02-26 PROCEDURE — 3062F POS MACROALBUMINURIA REV: CPT | Performed by: OBSTETRICS & GYNECOLOGY

## 2024-02-26 PROCEDURE — 0503F POSTPARTUM CARE VISIT: CPT | Performed by: OBSTETRICS & GYNECOLOGY

## 2024-02-26 PROCEDURE — 1036F TOBACCO NON-USER: CPT | Performed by: OBSTETRICS & GYNECOLOGY

## 2024-02-26 ASSESSMENT — EDINBURGH POSTNATAL DEPRESSION SCALE (EPDS)
I HAVE LOOKED FORWARD WITH ENJOYMENT TO THINGS: AS MUCH AS I EVER DID
I HAVE FELT SCARED OR PANICKY FOR NO GOOD REASON: NO, NOT AT ALL
I HAVE BEEN SO UNHAPPY THAT I HAVE HAD DIFFICULTY SLEEPING: NOT AT ALL
I HAVE BEEN ANXIOUS OR WORRIED FOR NO GOOD REASON: HARDLY EVER
TOTAL SCORE: 4
THINGS HAVE BEEN GETTING ON TOP OF ME: NO, MOST OF THE TIME I HAVE COPED QUITE WELL
I HAVE BLAMED MYSELF UNNECESSARILY WHEN THINGS WENT WRONG: NOT VERY OFTEN
I HAVE FELT SAD OR MISERABLE: NO, NOT AT ALL
THE THOUGHT OF HARMING MYSELF HAS OCCURRED TO ME: NEVER
I HAVE BEEN SO UNHAPPY THAT I HAVE BEEN CRYING: NO, NEVER
I HAVE BEEN ABLE TO LAUGH AND SEE THE FUNNY SIDE OF THINGS: NOT QUITE SO MUCH NOW

## 2024-02-26 NOTE — PROGRESS NOTES
Subjective     Telehealth visit      Patient ID: Jackelin Umana is a 49 y.o. female who presents for Postpartum Follow-up. 10 day PPV for BP check for vaginal delivery by Dr. Quintero at [facility] on 2/16/24. Baby girl, Sanjana, 7lbs 11oz. Patient is currently taking Amlodipine 5mg daily.    2/18                 142/72  2/19   136/75   131/72  2/20   139/77   140/82  2/21   148/82   148/79  2/22   137/81   145/83  2/23   148/80   147/80  2/24   148/77   138/80   2/25   145/76   145/80  2/26   148/83     HPI  10 days PP vag birth w CHTN    Doing well. No complaints.   Has no sx. Feels well.     Review of Systems  Neg . Minimal vag bleeding.     Objective   Physical Exam  None   Assessment/Plan   Diagnoses and all orders for this visit:  Postpartum exam  Chronic hypertension affecting pregnancy  Was not on meds during preg , started it postpartum in hospital   Stay on Amlodipine 5 mg daily.   See me next week .    MD Juliann Larsen, [facility] 02/26/24 1:56 PM

## 2024-02-27 ENCOUNTER — LACTATION CONSULT (OUTPATIENT)
Dept: LACTATION | Facility: CLINIC | Age: 49
End: 2024-02-27
Payer: COMMERCIAL

## 2024-02-27 ENCOUNTER — APPOINTMENT (OUTPATIENT)
Dept: RADIOLOGY | Facility: CLINIC | Age: 49
End: 2024-02-27
Payer: COMMERCIAL

## 2024-02-27 PROCEDURE — 99211 OFF/OP EST MAY X REQ PHY/QHP: CPT | Performed by: EMERGENCY MEDICINE

## 2024-02-28 NOTE — PROGRESS NOTES
Lactation Counseling Note    Subjective:    Jackelin Umana is a 49 y.o. patient referred for lactation counseling. She is here today due to Low milk supply and Latch issues. She was referred by her  self .     OB HISTORY:   Mode of Delivery: Normal vaginal route    Objective:    BREASTFEEDING ASSESSMENT:   Physical Exam    Baby Name: Sanjana  Breast Assessment: Large, Pendulous, Symmetrical, Filling, Breast changes observed in pregnancy? Yes, describe:  , Soft, Warm, and Compressible  Nipple Assessment/Stage: intact, large diameter, and erect    Areola: Normal  Feeding Position: breast sandwich, c - hold, cradle, both sides, and mother needs assistance with latch/positioning  Latch: no latch achieved, maximum assistance is needed, instructed on deep latch, cries while latching, shallow latch, mouth open/moves head side to side, mouth not open wide enough, and clenched jaws  Suck/Feeding: clenching/biting and does not suckle  Alternative Feeding Methods: paced bottle  Infant Feeding Method: Breast milk and Formula  Infant Behavior: awake, feeding cues observed, and rooting response  Infant Information: Post status frenotomy  Receding chin  Poor occlusion of lips around areola  Good cupping of tongue  Able to elevate tongue to roof of mouth  Breast Pain: Pain scale 0-10 (10 most pain): o  Nipple Pain: Pain scale 0-10 (10 most pain): 0  Weight: Weight before feedinlbs 4.8  Weight after feedinlbs 4.8  Amount of breast milk transferred: 0 ml  Number of voids in the last 24 hours: 8  Number of stools in the last 24 hours: 2    PATIENT DISCUSSION SUMMARY:  Mom here for assistance latching post frenotomy mom has been pumping using bottles and formula.  Weight is up 4 oz in 4 days back to birth weight.  Assisted with latch baby angry and unable to latch on to large nipple .  Tried a nipple shield without success  revd paced feeding mom to slow feeding making baby work for the milk.  Mom to return 3/6/24  LACTATION  PROBLEMS AND STRATEGIES:  Refuses to nurse: Check to see if the baby needs to burp or pass a bowel movement  Drip milk near corner of mouth after latch on to encourage baby to remain at breast  Eliminate new perfumes, cosmetics, laundry products, etc  Eliminate nipple creams or ointments  Express milk onto baby's lips  If due to forceful let down, break suction, then put the baby back on when the flow slows down  If the baby is easily distracted, remove competing stimuli  If there is an overabundant milk supply in the early weeks, offer one breast per feeding   Nurse in laid back (biological nurturing) position  Nurse when the baby is sleepy  Nurse while walking or rocking  Relieve engorgement by expressing milk  Reposition the baby if he/she recently received an injection or has other injury causing positional discomfort  Review latch on and positioning information  Skin to skin contact in between feedings may help  Take time out from nursing session when mom and baby become frustrated  PATIENT INSTRUCTION HANDOUTS GIVEN:      LACTATION EDUCATION:  Waking Techniques, Correct Positioning, Correct Latch On, and Demo use of Pump

## 2024-02-29 ENCOUNTER — APPOINTMENT (OUTPATIENT)
Dept: OBSTETRICS AND GYNECOLOGY | Facility: CLINIC | Age: 49
End: 2024-02-29
Payer: COMMERCIAL

## 2024-03-04 ENCOUNTER — TELEPHONE (OUTPATIENT)
Dept: OBSTETRICS AND GYNECOLOGY | Facility: CLINIC | Age: 49
End: 2024-03-04
Payer: COMMERCIAL

## 2024-03-04 NOTE — TELEPHONE ENCOUNTER
Patient left message on ob line with her BP log for Dr Lewis to review:    2-26   147/82 pm    2-27  128/80 am  148/81 pm    2/28  142/82 am  133/82 pm    2-29   139/85 am  147/79 pm    3-1  143/75 am  142/84 pm    3-2   138/68 am  138/80 pm    3-3  143/81 am  140/78 pm    3-4   145/80 am    Patient states that Dr Lewis said she would call her in the afternoon to discuss next steps.  Please advise    --------------------------------------------------------------------------------  Spoke to pt  Doing well. No complaints.     Taking Amlodipine = Norvasc 5 mg daily, no change in BPs at 2 wk PP time.  Increase to 10 mg daily .    Call me next week w BPs.     Pt agrees, Eri Lewis MD     negative

## 2024-03-06 ENCOUNTER — LACTATION CONSULT (OUTPATIENT)
Dept: LACTATION | Facility: CLINIC | Age: 49
End: 2024-03-06
Payer: COMMERCIAL

## 2024-03-06 PROCEDURE — 99211 OFF/OP EST MAY X REQ PHY/QHP: CPT | Performed by: EMERGENCY MEDICINE

## 2024-03-06 NOTE — PROGRESS NOTES
Lactation Counseling Note    Subjective:    Jackelin Umana is a 49 y.o. patient referred for lactation counseling. She is here today due to Fussy/Refuses to nurse and Latch issues. She was referred by her  self .     OB HISTORY:   Mode of Delivery: Normal vaginal route    Objective:    BREASTFEEDING ASSESSMENT:   Physical Exam    Breast Assessment: Large, Pendulous, Symmetrical, Filling, Soft, Warm, and Compressible  Nipple Assessment/Stage: intact and large diameter    Areola: Normal  Feeding Position: baby - led, cradle, straddle, skin to skin, both sides, nipple to nose, mother demonstrates good positioning, and mother needs assistance with latch/positioning  Latch: reluctant, maximum assistance is needed, instructed on deep latch, cries while latching, shallow latch, mouth open/moves head side to side, chin and lower lip contact breast first, flanged lips, clenched jaws, comfortable latch, and minimal audible swallowing  Suck/Feeding: clenching/biting and does not suckle  Feeding and Cleaning instructions reviewed for: Paced bottle  Infant Feeding Method: Breast milk and Formula  Infant Behavior: awake, readiness to feed, feeding cues observed, and suckles on and off, needs stimulation  Infant Information: Post status frenotomy  Receding chin  Poor occlusion of lips around areola  Good cupping of tongue  Good lateral movement of the tongue  Able to elevate tongue to roof of mouth  Breast Pain: Pain scale 0-10 (10 most pain): 0  Nipple Pain: Pain scale 0-10 (10 most pain): 0  Weight: Weight before feedinlbs 14.7oz  Weight after feedinlbs 14.8oz  Amount of breast milk transferred: 0.1oz ml    PATIENT DISCUSSION SUMMARY:  Mom here for assist with latching mom states she is able to feed only ebm and has stopped using formula.  She said baby is attempting to latch but becomes very frustrated.  Assisted with latch baby unable to get deep enough.  Mom has a very large nipple.  Mom to continue to pump and try  feeds hopefully time with help baby grow into the nipple.continue plan follow up next Wednesday  LACTATION PROBLEMS AND STRATEGIES:  Problems latching baby to breast: Baby should latch to areola (dark area) not just the nipple.  Baby's lips should be flanged outward.  Bring the baby up to the level of your breast by putting a pillow under the baby.  Do not use bottles or pacifiers until latching on well.  Dribble milk over the nipple or express milk so that baby can taste it  Encourage a deeper latch with the asymetrical latch- lining baby's nose opposite mother's nipple.  Encourage nipple to stand up prior to feeing by pumping, nipple rolling or by brief application of ice.  Gently tickle your baby's lip with your nipple to encourage your baby to open his/her mouth wide.  Hold baby so that your breast is positioned deep in the baby's mouth.  Hold your baby close, tummy to tummy.  If baby does not latch to breast, express breast milk.  If engorged, express some milk to soften breast.  If the baby is not latched on well, break seal and gently try again.  Keep baby skin to skin and watch for feeding cues.  Massage breast to start milk-ejection reflex.  Place nipple and at least 1 inch of areola into baby's mouth.  Place your hand behind the baby's neck and shoulder.  Do not force baby's head into breast.  Put baby in the football hold or clutch hold, supporting his/her neck and head in sniffing position to open his/her throat.  Shape breast into oval shape (sandwich hold)  for deep latch.  Try different feeding positions (cradle, football, side etc.).  Use a breast feeding pillow to bring baby up to the level of the breast.  Use nipple shield and monitor baby's weight gain and output.  Use semi-reclined feeding position to allow baby to take an active role and trigger baby's inborn feeding behavior.  Use your hand to support your breast during feeding.  When your baby's mouth is wide open, quickly pull baby into your  breast.  Your baby's chin should be pressed into your breast.  PATIENT INSTRUCTION HANDOUTS GIVEN:      LACTATION EDUCATION:  Waking Techniques, Correct Positioning, Correct Latch On, and Demo use of Pump

## 2024-03-11 ENCOUNTER — TELEPHONE (OUTPATIENT)
Dept: OBSTETRICS AND GYNECOLOGY | Facility: CLINIC | Age: 49
End: 2024-03-11
Payer: COMMERCIAL

## 2024-03-11 NOTE — TELEPHONE ENCOUNTER
Patient left message on OB line with her BP log for Dr Lewis to review.  Currently taking Amlodipine = Norvasc 10 mg daily    BP LOG:     3-4   145/80 am  148/83    3-5   135/77 am  143/80 pm    3-6  144/76 am  139/79 pm    3-7  136/72 am  138/82 pm    3-8  135/76 am  134/72 pm    3-9  133/72 am  138/67 pm    3-10   128/71 am  125/64 pm    3-11  123/67 am    Please advise

## 2024-03-11 NOTE — PATIENT INSTRUCTIONS
Mom here for assist with latching mom states she is able to feed only ebm and has stopped using formula.  She said baby is attempting to latch but becomes very frustrated.  Assisted with latch baby unable to get deep enough.  Mom has a very large nipple.  Mom to continue to pump and try feeds hopefully time with help baby grow into the nipple.continue plan follow up next Wednesday

## 2024-03-12 NOTE — PATIENT INSTRUCTIONS
Mom here for assistance latching post frenotomy mom has been pumping using bottles and formula.  Weight is up 4 oz in 4 days back to birth weight.  Assisted with latch baby angry and unable to latch on to large nipple .  Tried a nipple shield without success  revd paced feeding mom to slow feeding making baby work for the milk.  Mom to return 3/6/24

## 2024-03-13 ENCOUNTER — LACTATION CONSULT (OUTPATIENT)
Dept: LACTATION | Facility: CLINIC | Age: 49
End: 2024-03-13
Payer: COMMERCIAL

## 2024-03-13 PROCEDURE — 99211 OFF/OP EST MAY X REQ PHY/QHP: CPT | Performed by: EMERGENCY MEDICINE

## 2024-03-13 NOTE — PATIENT INSTRUCTIONS
Mom here for weight check.  Mom states baby is doing better at going to breast but not sustaining. Mom pumping more milk mom to go for body work again and continue with the plan.  Follow up 3.21.24

## 2024-03-13 NOTE — PROGRESS NOTES
Lactation Counseling Note    Subjective:    Jackelin Umana is a 49 y.o. patient referred for lactation counseling. She is here today due to Fussy/Refuses to nurse and Latch issues. She was referred by her  self .     OB HISTORY:   Mode of Delivery: Normal vaginal route    Objective:    BREASTFEEDING ASSESSMENT:   Physical Exam    Breast Assessment: Large, Pendulous, Filling, Soft, Warm, and Compressible  Nipple Assessment/Stage: intact, large diameter, erect, and rounded after feeding    Areola: Normal  Feeding Position: baby - led, cradle, laid back, skin to skin, both sides, nipple to nose, and mother demonstrates good positioning  Latch: moderate assistance is needed, shallow latch, bursts of sucking, swallowing, and rest, comfortable latch, and minimal audible swallowing  Suck/Feeding: unsustained, disorganized suck, clenching/biting, and supplemented breast  Alternative Feeding Methods: nursing at the breast and paced bottle  Infant Feeding Method: Breast milk only  Infant Behavior: awake and light sleep  Infant Information: Ankyloglossia  Poor occlusion of lips around areola  Good cupping of tongue  Good lateral movement of the tongue  Able to elevate tongue to roof of mouth  Nipple Pain: Pain scale 0-10 (10 most pain): 0  Weight: Weight before feedinlb 9.3oz  Weight after feedinlbs 9.6oz  Amount of breast milk transferred: 0.3oz ml  Number of voids in the last 24 hours: 8  Number of stools in the last 24 hours: 0    PATIENT DISCUSSION SUMMARY:  Mom here for weight check.  Mom states baby is doing better at going to breast but not sustaining. Mom pumping more milk mom to go for body work again and continue with the plan.  Follow up 3  LACTATION PROBLEMS AND STRATEGIES:  Problems latching baby to breast: Baby should latch to areola (dark area) not just the nipple.  Baby's lips should be flanged outward.  Bring the baby up to the level of your breast by putting a pillow under the baby.  Do not use  bottles or pacifiers until latching on well.  Dribble milk over the nipple or express milk so that baby can taste it  Encourage a deeper latch with the asymetrical latch- lining baby's nose opposite mother's nipple.  Encourage nipple to stand up prior to feeing by pumping, nipple rolling or by brief application of ice.  Gently tickle your baby's lip with your nipple to encourage your baby to open his/her mouth wide.  Hold baby so that your breast is positioned deep in the baby's mouth.  Hold your baby close, tummy to tummy.  If baby does not latch to breast, express breast milk.  If engorged, express some milk to soften breast.  If the baby is not latched on well, break seal and gently try again.  Keep baby skin to skin and watch for feeding cues.  Massage breast to start milk-ejection reflex.  Place nipple and at least 1 inch of areola into baby's mouth.  Place your hand behind the baby's neck and shoulder.  Do not force baby's head into breast.  Put baby in the football hold or clutch hold, supporting his/her neck and head in sniffing position to open his/her throat.  Shape breast into oval shape (sandwich hold)  for deep latch.  Try different feeding positions (cradle, football, side etc.).  Use a breast feeding pillow to bring baby up to the level of the breast.  Use nipple shield and monitor baby's weight gain and output.  Use semi-reclined feeding position to allow baby to take an active role and trigger baby's inborn feeding behavior.  Use your hand to support your breast during feeding.  When your baby's mouth is wide open, quickly pull baby into your breast.  Your baby's chin should be pressed into your breast.  PATIENT INSTRUCTION HANDOUTS GIVEN:      LACTATION EDUCATION:  Correct Positioning and Correct Latch On

## 2024-03-21 ENCOUNTER — LACTATION CONSULT (OUTPATIENT)
Dept: LACTATION | Facility: CLINIC | Age: 49
End: 2024-03-21
Payer: COMMERCIAL

## 2024-03-21 PROCEDURE — 99211 OFF/OP EST MAY X REQ PHY/QHP: CPT | Performed by: EMERGENCY MEDICINE

## 2024-03-21 NOTE — PROGRESS NOTES
Lactation Counseling Note    Subjective:    Jackelin Umana is a 49 y.o. patient referred for lactation counseling. She is here today due to Latch issues. She was referred by her  self .     OB HISTORY:   /Para: Weeks Gestation: 37.5 wks    Objective:    BREASTFEEDING ASSESSMENT:   Physical Exam    Breast Assessment: Large, Pendulous, Full, Compressible, and Readiness to feed  Nipple Assessment/Stage: intact, large diameter, erect, and long none  Areola: Normal  Feeding Position: baby - led, breast sandwich, cross - cradle, laid back, skin to skin, one side per feeding, nipple to nose, and mother needs assistance with latch/positioning  Latch: maximum assistance is needed, cries while latching, shallow latch, comfortable with no pain, chin and lower lip contact breast first, minimal audible swallowing, and non nutritive sucking  Suck/Feeding: sustained, clenching/biting, tactile stimulation needed, non-nutritional sucking, and supplemented breast  Alternative Feeding Methods: nursing at the breast, feeding expressed breast milk, and paced bottle  Infant Feeding Method: Breast milk only  Infant Behavior: awake, active alert, crying, readiness to feed, feeding cues observed, rooting response, and only non nutritive sucking at breast  Infant Information: Post status frenotomy  Poor occlusion of lips around areola  Tongue humped/bunched/retracted/elevated  Good cupping of tongue  Fontanel: flat  Mucous Membranes: moist  Weight: Weight before feedin lb. 1.7 oz.   Weight after feedin lb 1.9 oz.  Amount of breast milk transferred: 0.2 oz. ml  Number of voids in the last 24 hours: 8  Number of stools in the last 24 hours: 4-5    PATIENT DISCUSSION SUMMARY: Mom and baby here for continued weight monitoring and assistance with latch. Mom has been expressing milk and almost all feeds are expressed milk via bottle. Mom states she puts infant to breast for some of the feedings, many times after the bottle feed, but  "she falls asleep usually. Infant weighed prior to feed at 9 lb 1.7 oz which is a gain of 8.4 oz. In 7 days. Rate of gain is appropriate for age of baby. Assisted with attempt at breast. Milk easily expressed and sprsaying infant when manual expression done before latch. Infant continues to hold tongue up within mouth, but was able to latch. Infant appears uncomfortable with more breast tissue in her mouth, starts out wide, then repositions to be latched only to nipple. Mom reports taking infant to chiropractor and will continue to do so. Infant only with non nutritive sucking at breast. Attempted use of shield, but nipple too wide and long for effective use of shield. Attempted to use syringe and SNS tubing with expressed breast milk while latched in hopes that sucking would begin to become nutritive. Infant unable to obtain rhythmic sucking pattern. Infant reweighed and transferred 0.2 oz which was the amount in syringe so essentially no transfer from breast. Remainder of feeding done via paced bottle feeding. Mom observed raising and lowering bottle at a fast pace while bottle feeding.  Demonstrated paced bottle feeding more slowly so that infant takes approx. 10 mins. Per ounce. Mom finished feeding using slower rate. Infant completed 50mls bottle easily. Discussed how infant suck is rate dependent and how infant has not yet been able to stimulate a let down at breast therefore just \"suckles\" non nutritively. Suggested possibly using pump prior to latch just to stimulate let-down then latch infant to breast. If unable to obtain good rhythmic sucks with audible swallows then mom to continue with expressed milk via bottles for feeds. Plans to return next week on Wednesday 3-37-24 for further follow up.     LACTATION PROBLEMS AND STRATEGIES:  Problems latching baby to breast: Baby should latch to areola (dark area) not just the nipple.  Baby's lips should be flanged outward.  Bring the baby up to the level of your " breast by putting a pillow under the baby.  Do not use bottles or pacifiers until latching on well.  Dribble milk over the nipple or express milk so that baby can taste it  Encourage a deeper latch with the asymetrical latch- lining baby's nose opposite mother's nipple.  Encourage nipple to stand up prior to feeing by pumping, nipple rolling or by brief application of ice.  Gently tickle your baby's lip with your nipple to encourage your baby to open his/her mouth wide.  Hold baby so that your breast is positioned deep in the baby's mouth.  Hold your baby close, tummy to tummy.  If baby does not latch to breast, express breast milk.  If engorged, express some milk to soften breast.  If the baby is not latched on well, break seal and gently try again.  Keep baby skin to skin and watch for feeding cues.  Massage breast to start milk-ejection reflex.  Place nipple and at least 1 inch of areola into baby's mouth.  Place your hand behind the baby's neck and shoulder.  Do not force baby's head into breast.  Put baby in the football hold or clutch hold, supporting his/her neck and head in sniffing position to open his/her throat.  Shape breast into oval shape (sandwich hold)  for deep latch.  Try different feeding positions (cradle, football, side etc.).  Use a breast feeding pillow to bring baby up to the level of the breast.  Use nipple shield and monitor baby's weight gain and output.  Use semi-reclined feeding position to allow baby to take an active role and trigger baby's inborn feeding behavior.  Use your hand to support your breast during feeding.  When your baby's mouth is wide open, quickly pull baby into your breast.  Your baby's chin should be pressed into your breast.  PATIENT INSTRUCTION HANDOUTS GIVEN:   N/a  LACTATION EDUCATION:  Correct Positioning and Correct Latch On

## 2024-03-22 NOTE — PATIENT INSTRUCTIONS
"Mom and baby here for continued weight monitoring and assistance with latch. Mom has been expressing milk and almost all feeds are expressed milk via bottle. Mom states she puts infant to breast for some of the feedings, many times after the bottle feed, but she falls asleep usually. Infant weighed prior to feed at 9 lb 1.7 oz which is a gain of 8.4 oz. In 7 days. Rate of gain is appropriate for age of baby. Assisted with attempt at breast. Milk easily expressed and sprsaying infant when manual expression done before latch. Infant continues to hold tongue up within mouth, but was able to latch. Infant appears uncomfortable with more breast tissue in her mouth, starts out wide, then repositions to be latched only to nipple. Mom reports taking infant to chiropractor and will continue to do so. Infant only with non nutritive sucking at breast. Attempted use of shield, but nipple too wide and long for effective use of shield. Attempted to use syringe and SNS tubing with expressed breast milk while latched in hopes that sucking would begin to become nutritive. Infant unable to obtain rhythmic sucking pattern. Infant reweighed and transferred 0.2 oz which was the amount in syringe so essentially no transfer from breast. Remainder of feeding done via paced bottle feeding. Mom observed raising and lowering bottle at a fast pace while bottle feeding. Demonstrated paced bottle feeding more slowly so that infant takes approx. 10 mins. Per ounce. Mom finished feeding using slower rate. Infant completed 50mls bottle easily. Discussed how infant suck is rate dependent and how infant has not yet been able to stimulate a let down at breast therefore just \"suckles\" non nutritively. Suggested possibly using pump prior to latch just to stimulate let-down then latch infant to breast. If unable to obtain good rhythmic sucks with audible swallows then mom to continue with expressed milk via bottles for feeds. Plans to return next week on " Wednesday 3-37-24 for further follow up.

## 2024-03-27 ENCOUNTER — LACTATION CONSULT (OUTPATIENT)
Dept: LACTATION | Facility: CLINIC | Age: 49
End: 2024-03-27
Payer: COMMERCIAL

## 2024-03-27 PROCEDURE — 99211 OFF/OP EST MAY X REQ PHY/QHP: CPT | Performed by: EMERGENCY MEDICINE

## 2024-04-01 NOTE — PROGRESS NOTES
Lactation Counseling Note    Subjective:    Jackelin Umana is a 49 y.o. patient referred for lactation counseling. She is here today due to Latch issues. She was referred by her  self .     OB HISTORY:   /Para: Weeks Gestation: 37.5    Objective:    BREASTFEEDING ASSESSMENT:   Physical Exam    Baby Name: Sanjana  Breast Assessment: Large, Pendulous, Symmetrical, Full, Soft, Warm, Compressible, and Readiness to feed  Nipple Assessment/Stage: intact, large diameter, erect, and rounded after feeding    Areola: Normal  Feeding Position: breast sandwich, c - hold, cradle, skin to skin, both sides, nipple to nose, and mother demonstrates good positioning  Latch: moderate assistance is needed, shallow latch, sucking and swallowing, flanged lips, and comfortable latch  Suck/Feeding: unsustained and audible swallowing with stimulation  Alternative Feeding Methods: feeding expressed breast milk and paced bottle  Infant Behavior: awake, active alert, and suckles on and off, needs stimulation  Infant Information: Post status frenotomy  Poor occlusion of lips around areola  Good lateral movement of the tongue  Able to elevate tongue to roof of mouth  Nipple Pain: Pain scale 0-10 (10 most pain): 0  Weight: Weight before feedinlbs8.8oz  Weight after feedinlbs9.3oz  Amount of breast milk transferred: 15 ml    PATIENT DISCUSSION SUMMARY:  Mom here for weight check baby up over 8 oz in 7 days.  Mom states pumping is getting overwhelming and baby has not progressed with nursing.  Assisted with feeding with supplement at the breast.  Baby appeared to be latching and nursing but only transferred 15ml.  Discussed OT because baby has weaker disorganized suck. Baby has seen chiro twice and mom feels it has helped with tightness.  Mom to look into ot and follow up in one week  LACTATION PROBLEMS AND STRATEGIES:  Refuses to nurse: Check to see if the baby needs to burp or pass a bowel movement  Drip milk near corner of mouth  after latch on to encourage baby to remain at breast  Eliminate new perfumes, cosmetics, laundry products, etc  Eliminate nipple creams or ointments  Express milk onto baby's lips  If due to forceful let down, break suction, then put the baby back on when the flow slows down  If the baby is easily distracted, remove competing stimuli  If there is an overabundant milk supply in the early weeks, offer one breast per feeding   Nurse in laid back (biological nurturing) position  Nurse when the baby is sleepy  Nurse while walking or rocking  Relieve engorgement by expressing milk  Reposition the baby if he/she recently received an injection or has other injury causing positional discomfort  Review latch on and positioning information  Skin to skin contact in between feedings may help  Take time out from nursing session when mom and baby become frustrated  PATIENT INSTRUCTION HANDOUTS GIVEN:      LACTATION EDUCATION:  Correct Positioning, Correct Latch On, and Demo use of Pump

## 2024-04-02 NOTE — PATIENT INSTRUCTIONS
Mom here for weight check baby up over 8 oz in 7 days.  Mom states pumping is getting overwhelming and baby has not progressed with nursing.  Assisted with feeding with supplement at the breast.  Baby appeared to be latching and nursing but only transferred 15ml.  Discussed OT because baby has weaker disorganized suck. Baby has seen chiro twice and mom feels it has helped with tightness.  Mom to look into ot and follow up in one week

## 2024-04-03 ENCOUNTER — POSTPARTUM VISIT (OUTPATIENT)
Dept: OBSTETRICS AND GYNECOLOGY | Facility: CLINIC | Age: 49
End: 2024-04-03
Payer: COMMERCIAL

## 2024-04-03 ENCOUNTER — APPOINTMENT (OUTPATIENT)
Dept: OBSTETRICS AND GYNECOLOGY | Facility: CLINIC | Age: 49
End: 2024-04-03
Payer: COMMERCIAL

## 2024-04-03 VITALS
HEIGHT: 68 IN | DIASTOLIC BLOOD PRESSURE: 86 MMHG | BODY MASS INDEX: 27.19 KG/M2 | WEIGHT: 179.38 LBS | SYSTOLIC BLOOD PRESSURE: 122 MMHG

## 2024-04-03 DIAGNOSIS — Z01.419 WELL WOMAN EXAM WITH ROUTINE GYNECOLOGICAL EXAM: Primary | ICD-10-CM

## 2024-04-03 PROBLEM — O99.013 ANEMIA AFFECTING PREGNANCY IN THIRD TRIMESTER (HHS-HCC): Status: RESOLVED | Noted: 2023-12-10 | Resolved: 2024-04-03

## 2024-04-03 PROBLEM — O24.419 GESTATIONAL DIABETES MELLITUS (GDM) IN SECOND TRIMESTER (HHS-HCC): Status: RESOLVED | Noted: 2023-12-20 | Resolved: 2024-04-03

## 2024-04-03 PROBLEM — O10.919 CHRONIC HYPERTENSION AFFECTING PREGNANCY (HHS-HCC): Status: RESOLVED | Noted: 2023-10-09 | Resolved: 2024-04-03

## 2024-04-03 PROCEDURE — 87624 HPV HI-RISK TYP POOLED RSLT: CPT

## 2024-04-03 PROCEDURE — 0503F POSTPARTUM CARE VISIT: CPT | Performed by: OBSTETRICS & GYNECOLOGY

## 2024-04-03 PROCEDURE — 88175 CYTOPATH C/V AUTO FLUID REDO: CPT

## 2024-04-03 ASSESSMENT — EDINBURGH POSTNATAL DEPRESSION SCALE (EPDS)
I HAVE LOOKED FORWARD WITH ENJOYMENT TO THINGS: AS MUCH AS I EVER DID
I HAVE BEEN SO UNHAPPY THAT I HAVE BEEN CRYING: NO, NEVER
THE THOUGHT OF HARMING MYSELF HAS OCCURRED TO ME: NEVER
I HAVE FELT SAD OR MISERABLE: NO, NOT AT ALL
I HAVE FELT SCARED OR PANICKY FOR NO GOOD REASON: NO, NOT AT ALL
I HAVE BLAMED MYSELF UNNECESSARILY WHEN THINGS WENT WRONG: NOT VERY OFTEN
I HAVE BEEN ANXIOUS OR WORRIED FOR NO GOOD REASON: HARDLY EVER
TOTAL SCORE: 2
I HAVE BEEN SO UNHAPPY THAT I HAVE HAD DIFFICULTY SLEEPING: NOT AT ALL
I HAVE BEEN ABLE TO LAUGH AND SEE THE FUNNY SIDE OF THINGS: AS MUCH AS I ALWAYS COULD
THINGS HAVE BEEN GETTING ON TOP OF ME: NO, I HAVE BEEN COPING AS WELL AS EVER

## 2024-04-03 NOTE — PROGRESS NOTES
Subjective   Patient ID: Jackelin Umana is a 49 y.o. female who presents for Postpartum Care. 6 week PPV for vaginal delivery at [facility] by . Baby girl, Sanjana, 7lbs 11oz. Last pap done 2/5/19, normal HPV-.      Juliann Robison, [facility] 04/03/24 9:32 AM

## 2024-04-03 NOTE — PROGRESS NOTES
Postpartum Visit  Assessment/Plan   Jackelin Umana is a 49 y.o., , who delivered at 37w5d gestation and is now postpartum day 47. Patient is doing well overall. BP within normal range at this visit.     Plan  - Pap smear in office today  - Patient to follow up with PCP Dr. Henry Watson for glucose, TSH and BP checks.  Already has appt next week     Pregnancy Problems (from 23 to 24)       Problem Noted Resolved                        Gestational diabetes mellitus (GDM) in second trimester 2023 by Eri Lewis MD 4/3/2024 by Eri Lewis MD    Overview Addendum 2024 12:45 PM by Chandni Mahajan RN    Was on insulin in pregnancy . Aware to cont screening w PCP.                                      Chronic hypertension affecting pregnancy 10/9/2023 by JOSE MIGUEL Marin-CNM 4/3/2024 by Eri Lewis MD    Overview Addendum 2024  9:51 AM by Valarie Paniagua MD     -First noted at 15 weeks OB visit on 9/15/23   -                 Subjective   Patient reports no acute concerns at this time. Has been breast and bottle feeding her baby and reports that sometimes it is difficult to get her baby to latch on and feed effectively. Has lost about 30 lbs since pregnancy. Her plan for contraception is 's vasectomy.      Objective   Allergies:   Patient has no known allergies.         Last Vitals:  Temp Pulse Resp BP MAP Pulse Ox    36.7C  83  16 122/86         Physical Exam:     Appearance: Normal appearance. Affect normal and alert  Pulmonary:      Effort: Pulmonary effort is normal. Breath sounds clear  Skin: no rashes or lesions   Breasts:     Breasts bilaterally are symmetrical. No masses or axillary adenopathy. No skin or nipple changes    Abdominal:     Abdomen is flat, soft, nontender. No distension. No mass palpated.      Genitourinary:     Labia: no skin lesions or rash       Urethra: No lesions.      Bladder with no prolapse     Vagina: No discharge,  mucosa is pink with no lesions.     Cervix:  mobile and nontender no discharge     Uterus: Not enlarged, small, nontender.      Adnexa: Bilaterally with  No mass or tenderness.            Extremities:  Nontender, no edema. Normal range of motion     Lab Data:  None      I agree with this note. This patient was seen and examined by me.     Eir Solorio, MS3

## 2024-04-10 ENCOUNTER — LACTATION CONSULT (OUTPATIENT)
Dept: LACTATION | Facility: CLINIC | Age: 49
End: 2024-04-10
Payer: COMMERCIAL

## 2024-04-10 DIAGNOSIS — O92.70 LACTATION PROBLEM (HHS-HCC): Primary | ICD-10-CM

## 2024-04-10 PROCEDURE — 99211 OFF/OP EST MAY X REQ PHY/QHP: CPT | Performed by: EMERGENCY MEDICINE

## 2024-04-10 NOTE — PROGRESS NOTES
Lactation Counseling Note    Subjective:    Jackelin Umana is a 49 y.o. patient referred for lactation counseling. She is here today due to Latch issues. She was referred by her  self .     OB HISTORY:   Healthcare Provider: OB/GYN    /Para: Weeks Gestation: 36  Mode of Delivery: Normal vaginal route  Delivery Complications: None  Maternal Complications: None   Information:      Objective:    BREASTFEEDING ASSESSMENT:   Physical Exam    Breast Assessment: Large, Pendulous, Full, Soft, Warm, and Compressible  Nipple Assessment/Stage: intact    Areola: Normal  Feeding Position: baby - led, cradle, skin to skin, both sides, and mother demonstrates good positioning  Latch: minimal assistance is needed and shallow latch  Suck/Feeding: unsustained, disorganized suck, and non-nutritional sucking  Infant Behavior: awake, active alert, and sucking  Infant Information: Post status frenotomy  Receding chin  Poor occlusion of lips around areola  Good lateral movement of the tongue  Able to elevate tongue to roof of mouth  Nipple Pain: Pain scale 0-10 (10 most pain): 0  Weight: Weight before feeding: 10lbs 8.1oz  Weight after feeding: 10lbs 8.2oz  Amount of breast milk transferred: 0.10z ml    PATIENT DISCUSSION SUMMARY:  Mom here for weekly weight check.  Baby up 7 oz in 7 days.  Mom took baby to ot baby given exercises for poor suck flat palette and recided chin.  Mom really doesn't see a difference.  Mom putts baby to breast about 4 times per day.  Baby nursed here but didn'tot transfer. Mom is pumping 7 times per day, she is able to save 15oz extra per day.  Mom will comtinue with the plan.  Follow up next week  LACTATION PROBLEMS AND STRATEGIES:  Weak suck: Avoid pacifiers and bottles.  Contact the baby's doctor to do a thorough exam to rule out physical problems.  [place] with different positions to see if suck improves.  Express milk after, or between feedings, to build milk supply.  Provide supplement if  not gaining weight, preferably a nursing supplementer.  Review the latch-on and positioning information. Make sure the baby is pulled in close.  Stimulate the baby's lips by circling them with your finger several times.  Support the baby's jaw and chin using Dancer Hand Position.  Switch sides of nursing when baby dozes off. Repeat several times through feeding.  PATIENT INSTRUCTION HANDOUTS GIVEN:      LACTATION EDUCATION:  Correct Positioning, Correct Latch On, and Demo use of Pump

## 2024-04-10 NOTE — PATIENT INSTRUCTIONS
Mom here for weekly weight check.  Baby up 7 oz in 7 days.  Mom took baby to ot baby given exercises for poor suck flat palette and recided chin.  Mom really doesn't see a difference.  Mom putts baby to breast about 4 times per day.  Baby nursed here but didn'tot transfer. Mom is pumping 7 times per day, she is able to save 15oz extra per day.  Mom will comtinue with the plan.  Follow up next week

## 2024-04-16 ENCOUNTER — LACTATION CONSULT (OUTPATIENT)
Dept: LACTATION | Facility: CLINIC | Age: 49
End: 2024-04-16
Payer: COMMERCIAL

## 2024-04-16 DIAGNOSIS — O92.70 LACTATION PROBLEM (HHS-HCC): Primary | ICD-10-CM

## 2024-04-16 PROCEDURE — 99211 OFF/OP EST MAY X REQ PHY/QHP: CPT | Performed by: EMERGENCY MEDICINE

## 2024-04-17 ENCOUNTER — OFFICE VISIT (OUTPATIENT)
Dept: PRIMARY CARE | Facility: CLINIC | Age: 49
End: 2024-04-17
Payer: COMMERCIAL

## 2024-04-17 VITALS
HEART RATE: 73 BPM | SYSTOLIC BLOOD PRESSURE: 138 MMHG | OXYGEN SATURATION: 97 % | DIASTOLIC BLOOD PRESSURE: 90 MMHG | WEIGHT: 179 LBS | BODY MASS INDEX: 27.22 KG/M2

## 2024-04-17 LAB
ANION GAP SERPL CALC-SCNC: 13 MMOL/L (ref 10–20)
BUN SERPL-MCNC: 13 MG/DL (ref 6–23)
CALCIUM SERPL-MCNC: 9.7 MG/DL (ref 8.6–10.3)
CHLORIDE SERPL-SCNC: 105 MMOL/L (ref 98–107)
CO2 SERPL-SCNC: 27 MMOL/L (ref 21–32)
CREAT SERPL-MCNC: 0.73 MG/DL (ref 0.5–1.05)
EGFRCR SERPLBLD CKD-EPI 2021: >90 ML/MIN/1.73M*2
GLUCOSE SERPL-MCNC: 83 MG/DL (ref 74–99)
POTASSIUM SERPL-SCNC: 4.6 MMOL/L (ref 3.5–5.3)
SODIUM SERPL-SCNC: 140 MMOL/L (ref 136–145)
TSH SERPL-ACNC: 1.03 MIU/L (ref 0.44–3.98)

## 2024-04-17 PROCEDURE — 1036F TOBACCO NON-USER: CPT | Performed by: FAMILY MEDICINE

## 2024-04-17 PROCEDURE — 99214 OFFICE O/P EST MOD 30 MIN: CPT | Performed by: FAMILY MEDICINE

## 2024-04-17 PROCEDURE — 36415 COLL VENOUS BLD VENIPUNCTURE: CPT

## 2024-04-17 PROCEDURE — 83036 HEMOGLOBIN GLYCOSYLATED A1C: CPT

## 2024-04-17 PROCEDURE — 3075F SYST BP GE 130 - 139MM HG: CPT | Performed by: FAMILY MEDICINE

## 2024-04-17 PROCEDURE — 3080F DIAST BP >= 90 MM HG: CPT | Performed by: FAMILY MEDICINE

## 2024-04-17 PROCEDURE — 84443 ASSAY THYROID STIM HORMONE: CPT

## 2024-04-17 PROCEDURE — 3062F POS MACROALBUMINURIA REV: CPT | Performed by: FAMILY MEDICINE

## 2024-04-17 PROCEDURE — 80048 BASIC METABOLIC PNL TOTAL CA: CPT

## 2024-04-17 ASSESSMENT — ENCOUNTER SYMPTOMS
UNEXPECTED WEIGHT CHANGE: 0
ABDOMINAL PAIN: 0
DYSPHORIC MOOD: 0
SHORTNESS OF BREATH: 0
HEADACHES: 0

## 2024-04-17 ASSESSMENT — PATIENT HEALTH QUESTIONNAIRE - PHQ9
SUM OF ALL RESPONSES TO PHQ9 QUESTIONS 1 AND 2: 0
1. LITTLE INTEREST OR PLEASURE IN DOING THINGS: NOT AT ALL
2. FEELING DOWN, DEPRESSED OR HOPELESS: NOT AT ALL

## 2024-04-17 NOTE — ASSESSMENT & PLAN NOTE
BP borderline today but improving, no medication needed at this time, will check BMP  
Check A1c, TSH today  
Never smoker

## 2024-04-17 NOTE — PROGRESS NOTES
Subjective   Patient ID: Jackelin Umana is a 49 y.o. female who presents for No chief complaint on file..  HPI  Jackelin presents for discussion of GDM, some elevated BP after delivering her daughter Sanjana 2 months ago.  She feels well; has no concerns.  Review of Systems   Constitutional:  Negative for unexpected weight change.   Respiratory:  Negative for shortness of breath.    Cardiovascular:  Negative for chest pain.   Gastrointestinal:  Negative for abdominal pain.   Genitourinary:  Negative for vaginal bleeding.   Neurological:  Negative for headaches.   Psychiatric/Behavioral:  Negative for dysphoric mood.         Objective   Vitals:    04/17/24 0931   BP: (!) 149/99   Pulse: 73   SpO2: 97%   Weight: 81.2 kg (179 lb)      Physical Exam  Constitutional:       Appearance: Normal appearance.   HENT:      Head: Normocephalic and atraumatic.   Eyes:      Extraocular Movements: Extraocular movements intact.      Pupils: Pupils are equal, round, and reactive to light.   Cardiovascular:      Rate and Rhythm: Normal rate and regular rhythm.   Pulmonary:      Effort: Pulmonary effort is normal.      Breath sounds: Normal breath sounds.   Abdominal:      General: There is no distension.      Tenderness: There is no abdominal tenderness.   Musculoskeletal:      Cervical back: Normal range of motion and neck supple.   Neurological:      General: No focal deficit present.      Mental Status: She is alert and oriented to person, place, and time.   Psychiatric:         Mood and Affect: Mood normal.         Behavior: Behavior normal.         Assessment/Plan   There are no diagnoses linked to this encounter.    Problem List Items Addressed This Visit             ICD-10-CM    Gestational hypertension without significant proteinuria, postpartum ([facility]) - Primary O13.5     BP borderline today but improving, no medication needed at this time, will check BMP         Relevant Orders    Basic metabolic panel    Gestational diabetes  mellitus, postpartum ([facility]) O24.439     Check A1c, TSH today         Relevant Orders    Hemoglobin A1C    Tsh With Reflex To Free T4 If Abnormal

## 2024-04-18 LAB
EST. AVERAGE GLUCOSE BLD GHB EST-MCNC: 88 MG/DL
HBA1C MFR BLD: 4.7 %

## 2024-04-20 NOTE — PROGRESS NOTES
Lactation Counseling Note    Subjective:    Jackelin Umana is a 49 y.o. patient referred for lactation counseling. She is here today due to Latch issues. She was referred by her  self .     OB HISTORY:   Mode of Delivery: Normal vaginal route    Objective:    BREASTFEEDING ASSESSMENT:   Physical Exam    Breast Assessment: Large, Pendulous, Full, Soft, Warm, and Compressible  Nipple Assessment/Stage: intact, large diameter, and erect    Areola: Normal  Feeding Position: baby - led, breast sandwich, cross - cradle, skin to skin, both sides, nipple to nose, and mother demonstrates good positioning  Latch: minimal assistance is needed, instructed on deep latch, eagerly grasped on to latch, comfortable with no pain, flanged lips, comfortable latch, and nipple slides back and forth within baby's mouth  Suck/Feeding: unsustained, disorganized suck, tactile stimulation needed, non-nutritional sucking, and time problems/purse/elevated/thrusting  Infant Feeding Method: Breast milk only  Infant Behavior: active alert, readiness to feed, feeding cues observed, rooting response, and suckles on and off, needs stimulation  Infant Information: Post status frenotomy  Poor occlusion of lips around areola  Tongue humped/bunched/retracted/elevated  Good cupping of tongue  Good lateral movement of the tongue  Able to elevate tongue to roof of mouth    PATIENT DISCUSSION SUMMARY:  Mom here for weight check.  Baby up over 7 oz in 7 days.  Mom puts baby to breast 4-5 times per day.  Mom still going to OT  baby's sucking at the breast has not changed.  Mom is considering accepting the fact that baby can't nurse frim the breast and she is doing well with pumped milk . Baby has never transferred more that a few mls. Encouraged mom to call with any concerns with pumping or if she wants a transfer weight anytime.    LACTATION PROBLEMS AND STRATEGIES:  Weak suck: Avoid pacifiers and bottles.  Contact the baby's doctor to do a thorough exam to rule  out physical problems.  [place] with different positions to see if suck improves.  Express milk after, or between feedings, to build milk supply.  Provide supplement if not gaining weight, preferably a nursing supplementer.  Review the latch-on and positioning information. Make sure the baby is pulled in close.  Stimulate the baby's lips by circling them with your finger several times.  Support the baby's jaw and chin using Dancer Hand Position.  Switch sides of nursing when baby dozes off. Repeat several times through feeding.  PATIENT INSTRUCTION HANDOUTS GIVEN:      LACTATION EDUCATION:  Correct Positioning and Correct Latch On

## 2024-04-25 NOTE — PATIENT INSTRUCTIONS
Mom here for weight check.  Baby up over 7 oz in 7 days.  Mom puts baby to breast 4-5 times per day.  Mom still going to OT  baby's sucking at the breast has not changed.  Mom is considering accepting the fact that baby can't nurse frim the breast and she is doing well with pumped milk . Baby has never transferred more that a few mls. Encouraged mom to call with any concerns with pumping or if she wants a transfer weight anytime.

## 2024-05-08 ENCOUNTER — LACTATION CONSULT (OUTPATIENT)
Dept: LACTATION | Facility: CLINIC | Age: 49
End: 2024-05-08
Payer: COMMERCIAL

## 2024-05-08 DIAGNOSIS — O92.70 LACTATION PROBLEM (HHS-HCC): Primary | ICD-10-CM

## 2024-05-08 PROCEDURE — 99211 OFF/OP EST MAY X REQ PHY/QHP: CPT | Performed by: EMERGENCY MEDICINE

## 2024-05-08 NOTE — PROGRESS NOTES
Lactation Counseling Note    Subjective:    Jakcelin Umana is a 49 y.o. patient referred for lactation counseling. She is here today due to Latch issues. She was referred by her  self .     OB HISTORY:   Baby Name: Sanjana    Objective:    BREASTFEEDING ASSESSMENT:   Physical Exam    Feeding Position: cradle, both sides, and mother demonstrates good positioning  Latch: minimal assistance is needed, deep latch obtained, and comfortable with no pain  Suck/Feeding: sustained  Alternative Feeding Methods: nursing at the breast and feeding expressed breast milk  Weight: Weight before feedinlb 11.2oz  Weight after feedinlbs 11.9oz    PATIENT DISCUSSION SUMMARY:  Pt here for follow up weight check and difficulty transferring milk. Baby was seen at chiropractor yesterday for body work. Pt and chiropractor noticing improvements in latching and sucking. Baby nursed on both breasts and transferred 0.7oz total. Given EBM via bottle after nursing. Pt is pumping 6x/day and producing about 48oz total. Advised pt she can drop one pump per day if needed. Baby gaining well, plenty of wet and dirty diapers. Pt returning to work next week, will follow up in 2 weeks for weight check.     LACTATION PROBLEMS AND STRATEGIES:  Problems latching baby to breast: Baby should latch to areola (dark area) not just the nipple.  Baby's lips should be flanged outward.  Bring the baby up to the level of your breast by putting a pillow under the baby.  Do not use bottles or pacifiers until latching on well.  Dribble milk over the nipple or express milk so that baby can taste it  Encourage a deeper latch with the asymetrical latch- lining baby's nose opposite mother's nipple.  Encourage nipple to stand up prior to feeing by pumping, nipple rolling or by brief application of ice.  Gently tickle your baby's lip with your nipple to encourage your baby to open his/her mouth wide.  Hold baby so that your breast is positioned deep in the baby's  mouth.  Hold your baby close, tummy to tummy.  If baby does not latch to breast, express breast milk.  If engorged, express some milk to soften breast.  If the baby is not latched on well, break seal and gently try again.  Keep baby skin to skin and watch for feeding cues.  Massage breast to start milk-ejection reflex.  Place nipple and at least 1 inch of areola into baby's mouth.  Place your hand behind the baby's neck and shoulder.  Do not force baby's head into breast.  Put baby in the football hold or clutch hold, supporting his/her neck and head in sniffing position to open his/her throat.  Shape breast into oval shape (sandwich hold)  for deep latch.  Try different feeding positions (cradle, football, side etc.).  Use a breast feeding pillow to bring baby up to the level of the breast.  Use nipple shield and monitor baby's weight gain and output.  Use semi-reclined feeding position to allow baby to take an active role and trigger baby's inborn feeding behavior.  Use your hand to support your breast during feeding.  When your baby's mouth is wide open, quickly pull baby into your breast.  Your baby's chin should be pressed into your breast.  PATIENT INSTRUCTION HANDOUTS GIVEN:   .  LACTATION EDUCATION:  Correct Positioning and Correct Latch On

## 2024-05-08 NOTE — PATIENT INSTRUCTIONS
Pt here for follow up weight check and difficulty transferring milk. Baby was seen at chiropractor yesterday for body work. Pt and chiropractor noticing improvements in latching and sucking. Baby nursed on both breasts and transferred 0.7oz total. Given EBM via bottle after nursing. Pt is pumping 6x/day and producing about 48oz total. Advised pt she can drop one pump per day if needed. Baby gaining well, plenty of wet and dirty diapers. Pt returning to work next week, will follow up in 2 weeks for weight check.

## 2024-06-19 ENCOUNTER — LACTATION CONSULT (OUTPATIENT)
Dept: LACTATION | Facility: CLINIC | Age: 49
End: 2024-06-19
Payer: COMMERCIAL

## 2024-06-19 DIAGNOSIS — O92.70 LACTATION PROBLEM (HHS-HCC): Primary | ICD-10-CM

## 2024-06-19 PROCEDURE — 99211 OFF/OP EST MAY X REQ PHY/QHP: CPT | Performed by: EMERGENCY MEDICINE

## 2024-06-19 NOTE — PROGRESS NOTES
Lactation Counseling Note    Subjective:    Jackelin Umana is a 49 y.o. patient referred for lactation counseling. She is here today due to  weight check . She was referred by her  self .     OB HISTORY:   Baby Name: Sanjana    Objective:    BREASTFEEDING ASSESSMENT:  Physical Exam    Breast Assessment: Medium and Symmetrical  Feeding Position: cradle, cross - cradle, and both sides  Latch: deep latch obtained, comfortable with no pain, and sucks with long jaw movement  Suck/Feeding: sustained, coordinated suck/swallow/breathe, and content after feeding  Alternative Feeding Methods: nursing at the breast, feeding expressed breast milk, and paced bottle  Infant Behavior: awake, readiness to feed, and content after feeding    PATIENT DISCUSSION SUMMARY: Pt here today for weight check. At home she is pumping 4x/day and getting between 37-40oz/day. She is nursing baby exclusively at the breast during the night. Today, baby transferred 3.1oz after 4 breasts. Pt desires to stop pumping and exclusively nurse. Pt will come back next Wednesday for weight check. She is going to try nursing more at home in place of bottles over the next week and supplement as needed.     LACTATION PROBLEMS AND STRATEGIES:  .  PATIENT INSTRUCTION HANDOUTS GIVEN:   .  LACTATION EDUCATION:  Correct Positioning and Correct Latch On  .

## 2024-06-19 NOTE — PATIENT INSTRUCTIONS
Pt here today for weight check. At home she is pumping 4x/day and getting between 37-40oz/day. She is nursing baby exclusively at the breast during the night. Today, baby transferred 3.1oz after 4 breasts. Pt desires to stop pumping and exclusively nurse. Pt will come back next Wednesday for weight check. She is going to try nursing more at home in place of bottles over the next week and supplement as needed.

## 2024-06-26 ENCOUNTER — LACTATION CONSULT (OUTPATIENT)
Dept: LACTATION | Facility: CLINIC | Age: 49
End: 2024-06-26
Payer: COMMERCIAL

## 2024-06-26 DIAGNOSIS — O92.70 LACTATION PROBLEM (HHS-HCC): Primary | ICD-10-CM

## 2024-06-26 PROCEDURE — 99211 OFF/OP EST MAY X REQ PHY/QHP: CPT | Performed by: EMERGENCY MEDICINE

## 2024-06-26 NOTE — PROGRESS NOTES
Lactation Counseling Note    Subjective:    Jackelin Umana is a 49 y.o. patient referred for lactation counseling. She is here today due to  weight check . She was referred by her  self .     OB HISTORY:   Baby Name: Sanjana    Objective:    BREASTFEEDING ASSESSMENT:   Physical Exam    Latch: comfortable latch and frequent audible swallows    PATIENT DISCUSSION SUMMARY: Pt here for weight check after not supplementing for one week. Baby gained 2.7oz in 7 days. Baby transferred 3.7oz today. Will follow up as needed.     LACTATION PROBLEMS AND STRATEGIES:  Problems latching baby to breast: Baby should latch to areola (dark area) not just the nipple.  Baby's lips should be flanged outward.  Bring the baby up to the level of your breast by putting a pillow under the baby.  Do not use bottles or pacifiers until latching on well.  Dribble milk over the nipple or express milk so that baby can taste it  Encourage a deeper latch with the asymetrical latch- lining baby's nose opposite mother's nipple.  Encourage nipple to stand up prior to feeing by pumping, nipple rolling or by brief application of ice.  Gently tickle your baby's lip with your nipple to encourage your baby to open his/her mouth wide.  Hold baby so that your breast is positioned deep in the baby's mouth.  Hold your baby close, tummy to tummy.  If baby does not latch to breast, express breast milk.  If engorged, express some milk to soften breast.  If the baby is not latched on well, break seal and gently try again.  Keep baby skin to skin and watch for feeding cues.  Massage breast to start milk-ejection reflex.  Place nipple and at least 1 inch of areola into baby's mouth.  Place your hand behind the baby's neck and shoulder.  Do not force baby's head into breast.  Put baby in the football hold or clutch hold, supporting his/her neck and head in sniffing position to open his/her throat.  Shape breast into oval shape (sandwich hold)  for deep latch.  Try  different feeding positions (cradle, football, side etc.).  Use a breast feeding pillow to bring baby up to the level of the breast.  Use nipple shield and monitor baby's weight gain and output.  Use semi-reclined feeding position to allow baby to take an active role and trigger baby's inborn feeding behavior.  Use your hand to support your breast during feeding.  When your baby's mouth is wide open, quickly pull baby into your breast.  Your baby's chin should be pressed into your breast.  PATIENT INSTRUCTION HANDOUTS GIVEN:   .  LACTATION EDUCATION:  Correct Positioning and Correct Latch On

## 2024-06-26 NOTE — PATIENT INSTRUCTIONS
Pt here for weight check after not supplementing for one week. Baby gained 2.7oz in 7 days. Baby transferred 3.7oz today. Will follow up as needed.

## 2024-07-03 ENCOUNTER — LACTATION CONSULT (OUTPATIENT)
Dept: LACTATION | Facility: CLINIC | Age: 49
End: 2024-07-03
Payer: COMMERCIAL

## 2024-07-03 PROCEDURE — 99211 OFF/OP EST MAY X REQ PHY/QHP: CPT | Performed by: EMERGENCY MEDICINE

## 2024-07-03 NOTE — PROGRESS NOTES
Lactation Counseling Note    Subjective:    Jackelin Umana is a 49 y.o. patient referred for lactation counseling. She is here today due to  weigh check . She was referred by her  self .     OB HISTORY:   Mode of Delivery: Normal vaginal route    Objective:    BREASTFEEDING ASSESSMENT:   Physical Exam    Breast Assessment: Large, Pendulous, Symmetrical, Full, Warm, and Compressible  Nipple Assessment/Stage: intact, large diameter, erect, and rounded after feeding    Areola: Normal  Feeding Position: baby - led, breast sandwich, cradle, skin to skin, both sides, nipple to nose, and mother demonstrates good positioning  Latch: deep latch obtained, optimal angle of mouth opening, sucking and swallowing, sucks with long jaw movement, flanged lips, correct tongue position, and frequent audible swallows  Suck/Feeding: sustained, baby led rhythmically, audible swallowing, and content after feeding  Infant Feeding Method: Breast milk only  Infant Behavior: awake, active alert, readiness to feed, feeding cues observed, and content after feeding  Infant Information: Post status frenotomy  Tongue large/protruding/short/low tone  Good cupping of tongue  Good lateral movement of the tongue  Nipple Pain: Pain scale 0-10 (10 most pain): 0  Weight: Weight before feedinlbs 2.3oz  Weight after feedinbs 5.7  Amount of breast milk transferred: 3.4oz ml    PATIENT DISCUSSION SUMMARY:  Mom here for follow-up weight check after 1 week of no bubbles and no pumping.  Baby 8 ounces in 7 days and then proceeded to transfer 3.5 ounces at the breast.  Mom very very happy.  After 16 weeks of triple feeding pumping bottlefeeding baby was able for 1 week sustained very very well with exclusive breast-feeding.  Patient discharged from service [place] to come to support group for weight checks anytime she is needs to check-in.  LACTATION PROBLEMS AND STRATEGIES:  -  PATIENT INSTRUCTION HANDOUTS GIVEN:      LACTATION EDUCATION:  Correct  Positioning and Correct Latch On

## 2024-07-08 NOTE — PATIENT INSTRUCTIONS
Mom here for follow-up weight check after 1 week of no bubbles and no pumping.  Baby 8 ounces in 7 days and then proceeded to transfer 3.5 ounces at the breast.  Mom very very happy.  After 16 weeks of triple feeding pumping bottlefeeding baby was able for 1 week sustained very very well with exclusive breast-feeding.  Patient discharged from service welcome to come to support group for weight checks anytime she is needs to check-in.